# Patient Record
Sex: MALE | Race: WHITE | NOT HISPANIC OR LATINO | Employment: OTHER | URBAN - NONMETROPOLITAN AREA
[De-identification: names, ages, dates, MRNs, and addresses within clinical notes are randomized per-mention and may not be internally consistent; named-entity substitution may affect disease eponyms.]

---

## 2020-09-20 ENCOUNTER — HOSPITAL ENCOUNTER (EMERGENCY)
Facility: HOSPITAL | Age: 25
Discharge: HOME | End: 2020-09-20
Attending: EMERGENCY MEDICINE | Admitting: EMERGENCY MEDICINE

## 2020-09-20 ENCOUNTER — APPOINTMENT (OUTPATIENT)
Dept: GENERAL RADIOLOGY | Facility: HOSPITAL | Age: 25
End: 2020-09-20

## 2020-09-20 VITALS
WEIGHT: 289 LBS | SYSTOLIC BLOOD PRESSURE: 133 MMHG | HEIGHT: 73 IN | TEMPERATURE: 98.8 F | OXYGEN SATURATION: 99 % | RESPIRATION RATE: 16 BRPM | DIASTOLIC BLOOD PRESSURE: 91 MMHG | BODY MASS INDEX: 38.3 KG/M2 | HEART RATE: 81 BPM

## 2020-09-20 DIAGNOSIS — S83.92XA SPRAIN OF LEFT KNEE, UNSPECIFIED LIGAMENT, INITIAL ENCOUNTER: Primary | ICD-10-CM

## 2020-09-20 PROCEDURE — 73562 X-RAY EXAM OF KNEE 3: CPT

## 2020-09-20 PROCEDURE — 99283 EMERGENCY DEPT VISIT LOW MDM: CPT

## 2020-09-20 RX ORDER — IBUPROFEN 800 MG/1
800 TABLET ORAL ONCE
Status: COMPLETED | OUTPATIENT
Start: 2020-09-20 | End: 2020-09-20

## 2020-09-20 RX ORDER — SERTRALINE HYDROCHLORIDE 100 MG/1
100 TABLET, FILM COATED ORAL DAILY
COMMUNITY
End: 2021-07-23

## 2020-09-20 RX ORDER — DICLOFENAC SODIUM 75 MG/1
75 TABLET, DELAYED RELEASE ORAL 2 TIMES DAILY PRN
Qty: 30 TABLET | Refills: 0 | Status: SHIPPED | OUTPATIENT
Start: 2020-09-20 | End: 2021-07-23

## 2020-09-20 RX ORDER — TIZANIDINE HYDROCHLORIDE 4 MG/1
4 CAPSULE, GELATIN COATED ORAL 2 TIMES DAILY PRN
Qty: 20 CAPSULE | Refills: 0 | Status: SHIPPED | OUTPATIENT
Start: 2020-09-20 | End: 2021-07-23

## 2020-09-20 RX ORDER — BUPROPION HYDROCHLORIDE 100 MG/1
100 TABLET ORAL 2 TIMES DAILY
COMMUNITY
End: 2021-07-23 | Stop reason: DRUGHIGH

## 2020-09-20 RX ADMIN — IBUPROFEN 800 MG: 800 TABLET ORAL at 12:22

## 2020-09-20 NOTE — ED PROVIDER NOTES
"Subjective     History provided by:  Patient  Knee Pain  Location:  Knee  Time since incident:  1 hour  Injury: yes    Mechanism of injury: fall    Mechanism of injury comment:  Pt stood up at desk and left knee \"gave out.\"  Pain details:     Quality:  Aching and throbbing    Radiates to:  Does not radiate    Severity:  Mild    Onset quality:  Sudden    Timing:  Constant    Progression:  Unchanged  Chronicity:  New  Prior injury to area:  No  Relieved by:  Nothing  Worsened by:  Bearing weight  Ineffective treatments:  None tried  Associated symptoms: decreased ROM and stiffness    Associated symptoms: no fever    Risk factors: obesity        Review of Systems   Constitutional: Negative.  Negative for fever.   HENT: Negative.    Respiratory: Negative.    Cardiovascular: Negative.  Negative for chest pain.   Gastrointestinal: Negative.  Negative for abdominal pain.   Endocrine: Negative.    Genitourinary: Negative.  Negative for dysuria.   Musculoskeletal: Positive for arthralgias and stiffness.   Skin: Negative.    Neurological: Negative.    Psychiatric/Behavioral: Negative.    All other systems reviewed and are negative.      Past Medical History:   Diagnosis Date   • Anxiety    • Depression    • Intermittent explosive disorder in adult        No Known Allergies    History reviewed. No pertinent surgical history.    History reviewed. No pertinent family history.    Social History     Socioeconomic History   • Marital status: Single     Spouse name: Not on file   • Number of children: Not on file   • Years of education: Not on file   • Highest education level: Not on file   Tobacco Use   • Smoking status: Current Every Day Smoker     Packs/day: 2.00   Substance and Sexual Activity   • Alcohol use: Yes     Comment: occasionally   • Drug use: Never           Objective   Physical Exam  Vitals signs and nursing note reviewed.   Constitutional:       General: He is not in acute distress.     Appearance: He is " well-developed. He is not diaphoretic.   HENT:      Head: Normocephalic and atraumatic.      Right Ear: External ear normal.      Left Ear: External ear normal.      Nose: Nose normal.   Eyes:      Conjunctiva/sclera: Conjunctivae normal.   Neck:      Musculoskeletal: Normal range of motion and neck supple.      Vascular: No JVD.      Trachea: No tracheal deviation.   Cardiovascular:      Rate and Rhythm: Normal rate and regular rhythm.      Heart sounds: Normal heart sounds. No murmur.   Pulmonary:      Effort: Pulmonary effort is normal. No respiratory distress.      Breath sounds: Normal breath sounds. No wheezing.   Abdominal:      Palpations: Abdomen is soft.      Tenderness: There is no abdominal tenderness.   Musculoskeletal:         General: Tenderness present. No deformity.      Comments: Underneath full palpation of the left patella.  There is also pain with medial lateral flexion.   Skin:     General: Skin is warm and dry.      Coloration: Skin is not pale.      Findings: No erythema or rash.   Neurological:      Mental Status: He is alert and oriented to person, place, and time.      Cranial Nerves: No cranial nerve deficit.   Psychiatric:         Behavior: Behavior normal.         Thought Content: Thought content normal.         Procedures           ED Course  ED Course as of Sep 20 1224   Sun Sep 20, 2020   1222 XR ED interpretation:  No apparent acute bony abnormality.     [RB]      ED Course User Index  [RB] Migue Ramirez II, PA                                           MDM  Number of Diagnoses or Management Options  Sprain of left knee, unspecified ligament, initial encounter: new and requires workup     Amount and/or Complexity of Data Reviewed  Tests in the radiology section of CPT®: ordered and reviewed    Risk of Complications, Morbidity, and/or Mortality  Presenting problems: low  Diagnostic procedures: low  Management options: low    Patient Progress  Patient progress: stable      Final  diagnoses:   Sprain of left knee, unspecified ligament, initial encounter            Migue Ramirez II, PA  09/20/20 1724

## 2021-03-10 ENCOUNTER — HOSPITAL ENCOUNTER (OUTPATIENT)
Facility: HOSPITAL | Age: 26
Setting detail: OBSERVATION
Discharge: HOME | End: 2021-03-10
Attending: INTERNAL MEDICINE | Admitting: FAMILY MEDICINE

## 2021-03-10 ENCOUNTER — APPOINTMENT (OUTPATIENT)
Dept: MRI IMAGING | Facility: HOSPITAL | Age: 26
End: 2021-03-10

## 2021-03-10 ENCOUNTER — APPOINTMENT (OUTPATIENT)
Dept: CARDIOLOGY | Facility: HOSPITAL | Age: 26
End: 2021-03-10

## 2021-03-10 ENCOUNTER — APPOINTMENT (OUTPATIENT)
Dept: CT IMAGING | Facility: HOSPITAL | Age: 26
End: 2021-03-10

## 2021-03-10 VITALS
RESPIRATION RATE: 18 BRPM | TEMPERATURE: 98.2 F | DIASTOLIC BLOOD PRESSURE: 75 MMHG | WEIGHT: 307 LBS | OXYGEN SATURATION: 91 % | SYSTOLIC BLOOD PRESSURE: 125 MMHG | BODY MASS INDEX: 40.69 KG/M2 | HEIGHT: 73 IN | HEART RATE: 69 BPM

## 2021-03-10 DIAGNOSIS — R20.0 LEFT SIDED NUMBNESS: Primary | ICD-10-CM

## 2021-03-10 LAB
ALBUMIN SERPL-MCNC: 4.4 G/DL (ref 3.5–5.2)
ALBUMIN/GLOB SERPL: 2.2 G/DL
ALP SERPL-CCNC: 69 U/L (ref 39–117)
ALT SERPL W P-5'-P-CCNC: 51 U/L (ref 1–41)
ANION GAP SERPL CALCULATED.3IONS-SCNC: 12 MMOL/L (ref 5–15)
ASCENDING AORTA: 3.3 CM
AST SERPL-CCNC: 26 U/L (ref 1–40)
BASOPHILS # BLD AUTO: 0.09 10*3/MM3 (ref 0–0.2)
BASOPHILS NFR BLD AUTO: 1.5 % (ref 0–1.5)
BH CV ECHO MEAS - AO MAX PG (FULL): 1.4 MMHG
BH CV ECHO MEAS - AO MAX PG: 5.5 MMHG
BH CV ECHO MEAS - AO MEAN PG (FULL): 0.84 MMHG
BH CV ECHO MEAS - AO MEAN PG: 2.8 MMHG
BH CV ECHO MEAS - AO ROOT AREA (BSA CORRECTED): 1.3
BH CV ECHO MEAS - AO ROOT AREA: 8.7 CM^2
BH CV ECHO MEAS - AO ROOT DIAM: 3.3 CM
BH CV ECHO MEAS - AO V2 MAX: 117.3 CM/SEC
BH CV ECHO MEAS - AO V2 MEAN: 76 CM/SEC
BH CV ECHO MEAS - AO V2 VTI: 24.1 CM
BH CV ECHO MEAS - ASC AORTA: 3.3 CM
BH CV ECHO MEAS - AVA(I,A): 3.9 CM^2
BH CV ECHO MEAS - AVA(I,D): 3.9 CM^2
BH CV ECHO MEAS - AVA(V,A): 4.1 CM^2
BH CV ECHO MEAS - AVA(V,D): 4.1 CM^2
BH CV ECHO MEAS - BSA(HAYCOCK): 2.7 M^2
BH CV ECHO MEAS - BSA: 2.6 M^2
BH CV ECHO MEAS - BZI_BMI: 40.5 KILOGRAMS/M^2
BH CV ECHO MEAS - BZI_METRIC_HEIGHT: 185.4 CM
BH CV ECHO MEAS - BZI_METRIC_WEIGHT: 139.3 KG
BH CV ECHO MEAS - EDV(CUBED): 118 ML
BH CV ECHO MEAS - EDV(MOD-SP2): 139 ML
BH CV ECHO MEAS - EDV(MOD-SP4): 170 ML
BH CV ECHO MEAS - EDV(TEICH): 113 ML
BH CV ECHO MEAS - EF(CUBED): 66.1 %
BH CV ECHO MEAS - EF(MOD-BP): 55 %
BH CV ECHO MEAS - EF(MOD-SP2): 56.1 %
BH CV ECHO MEAS - EF(MOD-SP4): 53.5 %
BH CV ECHO MEAS - EF(TEICH): 57.5 %
BH CV ECHO MEAS - ESV(CUBED): 40 ML
BH CV ECHO MEAS - ESV(MOD-SP2): 61 ML
BH CV ECHO MEAS - ESV(MOD-SP4): 79 ML
BH CV ECHO MEAS - ESV(TEICH): 48.1 ML
BH CV ECHO MEAS - FS: 30.3 %
BH CV ECHO MEAS - IVS/LVPW: 0.99
BH CV ECHO MEAS - IVSD: 1.3 CM
BH CV ECHO MEAS - LA DIMENSION: 3.6 CM
BH CV ECHO MEAS - LA/AO: 1.1
BH CV ECHO MEAS - LAD MAJOR: 5.2 CM
BH CV ECHO MEAS - LAT PEAK E' VEL: 13.5 CM/SEC
BH CV ECHO MEAS - LATERAL E/E' RATIO: 6.6
BH CV ECHO MEAS - LV DIASTOLIC VOL/BSA (35-75): 65.8 ML/M^2
BH CV ECHO MEAS - LV MASS(C)D: 268.9 GRAMS
BH CV ECHO MEAS - LV MASS(C)DI: 104.2 GRAMS/M^2
BH CV ECHO MEAS - LV MAX PG: 4.1 MMHG
BH CV ECHO MEAS - LV MEAN PG: 1.9 MMHG
BH CV ECHO MEAS - LV SYSTOLIC VOL/BSA (12-30): 30.6 ML/M^2
BH CV ECHO MEAS - LV V1 MAX: 101.3 CM/SEC
BH CV ECHO MEAS - LV V1 MEAN: 63 CM/SEC
BH CV ECHO MEAS - LV V1 VTI: 20 CM
BH CV ECHO MEAS - LVIDD: 4.9 CM
BH CV ECHO MEAS - LVIDS: 3.4 CM
BH CV ECHO MEAS - LVLD AP2: 8.6 CM
BH CV ECHO MEAS - LVLD AP4: 8.7 CM
BH CV ECHO MEAS - LVLS AP2: 7.2 CM
BH CV ECHO MEAS - LVLS AP4: 7.3 CM
BH CV ECHO MEAS - LVOT AREA (M): 4.5 CM^2
BH CV ECHO MEAS - LVOT AREA: 4.7 CM^2
BH CV ECHO MEAS - LVOT DIAM: 2.4 CM
BH CV ECHO MEAS - LVPWD: 1.4 CM
BH CV ECHO MEAS - MED PEAK E' VEL: 11.2 CM/SEC
BH CV ECHO MEAS - MEDIAL E/E' RATIO: 8
BH CV ECHO MEAS - MV A MAX VEL: 51.1 CM/SEC
BH CV ECHO MEAS - MV DEC SLOPE: 379.6 CM/SEC^2
BH CV ECHO MEAS - MV DEC TIME: 0.13 SEC
BH CV ECHO MEAS - MV E MAX VEL: 90.5 CM/SEC
BH CV ECHO MEAS - MV E/A: 1.8
BH CV ECHO MEAS - MV MAX PG: 4.4 MMHG
BH CV ECHO MEAS - MV MEAN PG: 1.9 MMHG
BH CV ECHO MEAS - MV P1/2T MAX VEL: 103.7 CM/SEC
BH CV ECHO MEAS - MV P1/2T: 80.1 MSEC
BH CV ECHO MEAS - MV V2 MAX: 104.7 CM/SEC
BH CV ECHO MEAS - MV V2 MEAN: 65.4 CM/SEC
BH CV ECHO MEAS - MV V2 VTI: 29.9 CM
BH CV ECHO MEAS - MVA P1/2T LCG: 2.1 CM^2
BH CV ECHO MEAS - MVA(P1/2T): 2.7 CM^2
BH CV ECHO MEAS - MVA(VTI): 3.1 CM^2
BH CV ECHO MEAS - PA ACC SLOPE: 502.4 CM/SEC^2
BH CV ECHO MEAS - PA ACC TIME: 0.14 SEC
BH CV ECHO MEAS - PA MAX PG: 5.4 MMHG
BH CV ECHO MEAS - PA PR(ACCEL): 14.8 MMHG
BH CV ECHO MEAS - PA V2 MAX: 116.1 CM/SEC
BH CV ECHO MEAS - PI END-D VEL: 115.4 CM/SEC
BH CV ECHO MEAS - SI(AO): 81.2 ML/M^2
BH CV ECHO MEAS - SI(CUBED): 30.2 ML/M^2
BH CV ECHO MEAS - SI(LVOT): 36.5 ML/M^2
BH CV ECHO MEAS - SI(MOD-SP2): 30.2 ML/M^2
BH CV ECHO MEAS - SI(MOD-SP4): 35.2 ML/M^2
BH CV ECHO MEAS - SI(TEICH): 25.2 ML/M^2
BH CV ECHO MEAS - SV(AO): 209.6 ML
BH CV ECHO MEAS - SV(CUBED): 78 ML
BH CV ECHO MEAS - SV(LVOT): 94.3 ML
BH CV ECHO MEAS - SV(MOD-SP2): 78 ML
BH CV ECHO MEAS - SV(MOD-SP4): 91 ML
BH CV ECHO MEAS - SV(TEICH): 65 ML
BH CV ECHO MEASUREMENTS AVERAGE E/E' RATIO: 7.33
BH CV VAS BP LEFT ARM: NORMAL MMHG
BH CV XLRA - RV BASE: 4.5 CM
BH CV XLRA - RV LENGTH: 7.9 CM
BH CV XLRA - RV MID: 3.8 CM
BILIRUB SERPL-MCNC: 0.5 MG/DL (ref 0–1.2)
BUN SERPL-MCNC: 13 MG/DL (ref 6–20)
BUN/CREAT SERPL: 14.9 (ref 7–25)
CALCIUM SPEC-SCNC: 8.9 MG/DL (ref 8.6–10.5)
CHLORIDE SERPL-SCNC: 102 MMOL/L (ref 98–107)
CHOLEST SERPL-MCNC: 158 MG/DL (ref 0–200)
CO2 SERPL-SCNC: 23 MMOL/L (ref 22–29)
CREAT SERPL-MCNC: 0.87 MG/DL (ref 0.76–1.27)
DEPRECATED RDW RBC AUTO: 44.8 FL (ref 37–54)
EOSINOPHIL # BLD AUTO: 0.46 10*3/MM3 (ref 0–0.4)
EOSINOPHIL NFR BLD AUTO: 7.8 % (ref 0.3–6.2)
ERYTHROCYTE [DISTWIDTH] IN BLOOD BY AUTOMATED COUNT: 13.9 % (ref 12.3–15.4)
FLUAV RNA RESP QL NAA+PROBE: NOT DETECTED
FLUBV RNA RESP QL NAA+PROBE: NOT DETECTED
GFR SERPL CREATININE-BSD FRML MDRD: 108 ML/MIN/1.73
GLOBULIN UR ELPH-MCNC: 2 GM/DL
GLUCOSE BLDC GLUCOMTR-MCNC: 117 MG/DL (ref 70–130)
GLUCOSE BLDC GLUCOMTR-MCNC: 126 MG/DL (ref 70–130)
GLUCOSE SERPL-MCNC: 105 MG/DL (ref 65–99)
HBA1C MFR BLD: 5.1 % (ref 4.8–5.6)
HCT VFR BLD AUTO: 43.7 % (ref 37.5–51)
HDLC SERPL-MCNC: 19 MG/DL (ref 40–60)
HGB BLD-MCNC: 14.7 G/DL (ref 13–17.7)
IMM GRANULOCYTES # BLD AUTO: 0.02 10*3/MM3 (ref 0–0.05)
IMM GRANULOCYTES NFR BLD AUTO: 0.3 % (ref 0–0.5)
LDLC SERPL CALC-MCNC: 60 MG/DL (ref 0–100)
LDLC/HDLC SERPL: 1.87 {RATIO}
LEFT ATRIUM VOLUME INDEX: 21.7 ML/M^2
LEFT ATRIUM VOLUME: 56 ML
LYMPHOCYTES # BLD AUTO: 2.28 10*3/MM3 (ref 0.7–3.1)
LYMPHOCYTES NFR BLD AUTO: 38.6 % (ref 19.6–45.3)
MAXIMAL PREDICTED HEART RATE: 196 BPM
MCH RBC QN AUTO: 29.9 PG (ref 26.6–33)
MCHC RBC AUTO-ENTMCNC: 33.6 G/DL (ref 31.5–35.7)
MCV RBC AUTO: 88.8 FL (ref 79–97)
MONOCYTES # BLD AUTO: 0.6 10*3/MM3 (ref 0.1–0.9)
MONOCYTES NFR BLD AUTO: 10.2 % (ref 5–12)
NEUTROPHILS NFR BLD AUTO: 2.46 10*3/MM3 (ref 1.7–7)
NEUTROPHILS NFR BLD AUTO: 41.6 % (ref 42.7–76)
NRBC BLD AUTO-RTO: 0 /100 WBC (ref 0–0.2)
PLATELET # BLD AUTO: 280 10*3/MM3 (ref 140–450)
PMV BLD AUTO: 9.3 FL (ref 6–12)
POTASSIUM SERPL-SCNC: 3.3 MMOL/L (ref 3.5–5.2)
PROT SERPL-MCNC: 6.4 G/DL (ref 6–8.5)
RBC # BLD AUTO: 4.92 10*6/MM3 (ref 4.14–5.8)
SARS-COV-2 RNA RESP QL NAA+PROBE: NOT DETECTED
SODIUM SERPL-SCNC: 137 MMOL/L (ref 136–145)
STRESS TARGET HR: 167 BPM
TRIGL SERPL-MCNC: 517 MG/DL (ref 0–150)
TSH SERPL DL<=0.05 MIU/L-ACNC: 4.76 UIU/ML (ref 0.27–4.2)
VLDLC SERPL-MCNC: 79 MG/DL (ref 5–40)
WBC # BLD AUTO: 5.91 10*3/MM3 (ref 3.4–10.8)

## 2021-03-10 PROCEDURE — 84443 ASSAY THYROID STIM HORMONE: CPT | Performed by: INTERNAL MEDICINE

## 2021-03-10 PROCEDURE — G0378 HOSPITAL OBSERVATION PER HR: HCPCS

## 2021-03-10 PROCEDURE — 83036 HEMOGLOBIN GLYCOSYLATED A1C: CPT | Performed by: NURSE PRACTITIONER

## 2021-03-10 PROCEDURE — 99234 HOSP IP/OBS SM DT SF/LOW 45: CPT | Performed by: FAMILY MEDICINE

## 2021-03-10 PROCEDURE — 85025 COMPLETE CBC W/AUTO DIFF WBC: CPT | Performed by: INTERNAL MEDICINE

## 2021-03-10 PROCEDURE — 97165 OT EVAL LOW COMPLEX 30 MIN: CPT

## 2021-03-10 PROCEDURE — G0379 DIRECT REFER HOSPITAL OBSERV: HCPCS

## 2021-03-10 PROCEDURE — 70498 CT ANGIOGRAPHY NECK: CPT

## 2021-03-10 PROCEDURE — 0 IOPAMIDOL PER 1 ML: Performed by: INTERNAL MEDICINE

## 2021-03-10 PROCEDURE — 82962 GLUCOSE BLOOD TEST: CPT

## 2021-03-10 PROCEDURE — 0042T HC CT CEREBRAL PERFUSION W/WO CONTRAST: CPT

## 2021-03-10 PROCEDURE — 93306 TTE W/DOPPLER COMPLETE: CPT

## 2021-03-10 PROCEDURE — 70551 MRI BRAIN STEM W/O DYE: CPT

## 2021-03-10 PROCEDURE — 97110 THERAPEUTIC EXERCISES: CPT

## 2021-03-10 PROCEDURE — 97161 PT EVAL LOW COMPLEX 20 MIN: CPT

## 2021-03-10 PROCEDURE — 70496 CT ANGIOGRAPHY HEAD: CPT

## 2021-03-10 PROCEDURE — 80061 LIPID PANEL: CPT | Performed by: NURSE PRACTITIONER

## 2021-03-10 PROCEDURE — 80053 COMPREHEN METABOLIC PANEL: CPT | Performed by: INTERNAL MEDICINE

## 2021-03-10 PROCEDURE — 93306 TTE W/DOPPLER COMPLETE: CPT | Performed by: INTERNAL MEDICINE

## 2021-03-10 PROCEDURE — 92523 SPEECH SOUND LANG COMPREHEN: CPT

## 2021-03-10 PROCEDURE — 99205 OFFICE O/P NEW HI 60 MIN: CPT | Performed by: PSYCHIATRY & NEUROLOGY

## 2021-03-10 PROCEDURE — 87636 SARSCOV2 & INF A&B AMP PRB: CPT | Performed by: INTERNAL MEDICINE

## 2021-03-10 RX ORDER — AMOXICILLIN AND CLAVULANATE POTASSIUM 875; 125 MG/1; MG/1
1 TABLET, FILM COATED ORAL EVERY 12 HOURS SCHEDULED
Status: DISCONTINUED | OUTPATIENT
Start: 2021-03-10 | End: 2021-03-10 | Stop reason: HOSPADM

## 2021-03-10 RX ORDER — SODIUM CHLORIDE 0.9 % (FLUSH) 0.9 %
10 SYRINGE (ML) INJECTION AS NEEDED
Status: DISCONTINUED | OUTPATIENT
Start: 2021-03-10 | End: 2021-03-10 | Stop reason: HOSPADM

## 2021-03-10 RX ORDER — SERTRALINE HYDROCHLORIDE 100 MG/1
100 TABLET, FILM COATED ORAL DAILY
Status: DISCONTINUED | OUTPATIENT
Start: 2021-03-10 | End: 2021-03-10

## 2021-03-10 RX ORDER — SODIUM CHLORIDE 0.9 % (FLUSH) 0.9 %
10 SYRINGE (ML) INJECTION EVERY 12 HOURS SCHEDULED
Status: DISCONTINUED | OUTPATIENT
Start: 2021-03-10 | End: 2021-03-10 | Stop reason: HOSPADM

## 2021-03-10 RX ORDER — POTASSIUM CHLORIDE 750 MG/1
40 CAPSULE, EXTENDED RELEASE ORAL ONCE
Status: COMPLETED | OUTPATIENT
Start: 2021-03-10 | End: 2021-03-10

## 2021-03-10 RX ORDER — ATORVASTATIN CALCIUM 40 MG/1
80 TABLET, FILM COATED ORAL NIGHTLY
Status: DISCONTINUED | OUTPATIENT
Start: 2021-03-10 | End: 2021-03-10 | Stop reason: HOSPADM

## 2021-03-10 RX ORDER — ASPIRIN 325 MG
325 TABLET ORAL DAILY
Status: DISCONTINUED | OUTPATIENT
Start: 2021-03-10 | End: 2021-03-10 | Stop reason: HOSPADM

## 2021-03-10 RX ORDER — BUPROPION HYDROCHLORIDE 100 MG/1
100 TABLET ORAL EVERY 12 HOURS SCHEDULED
Status: DISCONTINUED | OUTPATIENT
Start: 2021-03-10 | End: 2021-03-10

## 2021-03-10 RX ORDER — ASPIRIN 300 MG/1
300 SUPPOSITORY RECTAL DAILY
Status: DISCONTINUED | OUTPATIENT
Start: 2021-03-10 | End: 2021-03-10 | Stop reason: HOSPADM

## 2021-03-10 RX ADMIN — SODIUM CHLORIDE, PRESERVATIVE FREE 10 ML: 5 INJECTION INTRAVENOUS at 09:19

## 2021-03-10 RX ADMIN — POTASSIUM CHLORIDE 40 MEQ: 750 CAPSULE, EXTENDED RELEASE ORAL at 11:50

## 2021-03-10 RX ADMIN — IOPAMIDOL 115 ML: 755 INJECTION, SOLUTION INTRAVENOUS at 04:37

## 2021-03-10 RX ADMIN — AMOXICILLIN AND CLAVULANATE POTASSIUM 1 TABLET: 875; 125 TABLET, FILM COATED ORAL at 05:38

## 2021-03-10 RX ADMIN — ASPIRIN 325 MG ORAL TABLET 325 MG: 325 PILL ORAL at 09:19

## 2021-03-10 NOTE — PLAN OF CARE
Goal Outcome Evaluation:  Plan of Care Reviewed With: patient  Progress: improving  Outcome Summary: OT eval complete. Pt completes bed mobility and CI and transfers with SBA and no AE. Pt ambulates with supervision and no AE. Pt independent with LBD and toileting this date. Pt does demosntrate L UE weakness and decreased L hand FM coordination. OT issued foam blocks as well as strengthening hand out and coordination exercises to improve strength and coordination in LUE. Recommend d/c home with OP OT services.

## 2021-03-10 NOTE — H&P
"    [facility] Medicine Services  HISTORY AND PHYSICAL    Patient Name: Luis Darby  : 1995  MRN: [id number]  Primary Care Physician: Forrest Lo PA  Date of admission: 3/10/2021    Subjective   Subjective     Chief Complaint:  Transfer for stroke  Left sided numbness/weakness    HPI:  Luis Darby is a 25 y.o. male with a history of psychiatric issues, tobacco abuse (2ppd smoker) presents as a transfer from [facility] ED for possible stroke, with left sided weakness and numbness. 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  He was able to make it to the ER. He denies trouble speaking or confusion, no facial droop.  He did have left sided facial numbness, as well as decreased sensation in his left arm and leg.  Pt states he did feel he \"zoned out\" at work today but otherwise felt like his usual self.    CT head was negative at the outside ED.  Upon arrival, patient was seen by the stroke navigator CLAUDIA and stat labs and imaging have been ordered and pending.  NIH score 1.    Denies CP, SOB, cough, fever, chills, N/V/D. He did have medication adjustment recently and started on lexapro.    Hospital medicine will admit for further evaluation and treatment.     Attending HPI:  24 y/o male with hx of tobacco abuse and morbid obesity as well as intermittent explosive disorder here w/ c/o onset of left sided numbness/weakness while driving around 10:45 pm.  Pt also noted feeling \"mentally off\" and states his co-workers noticed the same.  No facial droop but did have left sided facial numbness.  No prior hx of stroke.   Pt does note change in meds w/ the addition of lexapro last week.     NIH at OSH 1.    Current COVID Risks are:  [] Fever []  Cough [] Shortness of breath [] Fatigue [] Change in taste or smell    [] Exposure to COVID positive patient  [] High risk facility   [x]  NONE    Review of Systems   HENT: Positive for dental " problem.    Eyes: Negative.    Respiratory: Negative.    Cardiovascular: Negative.    Endocrine: Negative.    Genitourinary: Negative.    Musculoskeletal: Negative.    Skin: Negative.    Allergic/Immunologic: Negative.    Neurological: Positive for weakness and numbness.   Hematological: Negative.    Psychiatric/Behavioral: Negative.         All other systems reviewed and are negative.     Personal History     Past Medical History:   Diagnosis Date   • Anxiety    • Depression    • Intermittent explosive disorder in adult        No past surgical history on file.    Family History: family history is not on file. Otherwise pertinent FHx was reviewed and unremarkable.     Social History:  reports that he has been smoking. He has been smoking about 2.00 packs per day. He does not have any smokeless tobacco history on file. He reports current alcohol use. He reports that he does not use drugs.  Social History     Social History Narrative   • Not on file       Medications:  TiZANidine, buPROPion, diclofenac, and sertraline    No Known Allergies    Objective   Objective     Vital Signs:   Temp:  [97.5 °F (36.4 °C)] 97.5 °F (36.4 °C)  Heart Rate:  [69] 69  Resp:  [20] 20  BP: (145)/(77) 145/77    Physical Exam   Constitutional: Awake, alert, dissheveled male  Eyes: PERRLA, sclerae anicteric, no conjunctival injection  HENT: NCAT, mucous membranes moist  - poor dentition with necrotic odor  Neck: Supple, no thyromegaly, no lymphadenopathy, trachea midline  Respiratory: Clear to auscultation bilaterally, nonlabored respirations   Cardiovascular: RRR, no murmurs, rubs, or gallops, palpable pedal pulses bilaterally  Gastrointestinal: Positive bowel sounds, soft, nontender, nondistended  Musculoskeletal: No bilateral ankle edema, no clubbing or cyanosis to extremities  Psychiatric: Appropriate affect, cooperative  Neurologic: Oriented x 3, strength diminished left arm, leg  Decreased sensation left face, arm, leg  Skin: No  анна    Separate exam performed by me w/ no changes to the above.      Results Reviewed:  I have personally reviewed most recent indicated data and agree with findings including:  []  Laboratory  []  Radiology  []  EKG/Telemetry  []  Pathology  []  Cardiac/Vascular Studies  [x]  Old records  []  Other:      Most pertinent findings include:  Pending however OSH cbc, cmp unremarkable other than K 3.4.  HCT negative at OSH.    LAB RESULTS:                              Brief Urine Lab Results     None        Microbiology Results (last 10 days)     ** No results found for the last 240 hours. **          CT Cerebral Perfusion With & Without Contrast    Result Date: 3/10/2021  CT CEREBRAL PERFUSION W WO CONTRAST HISTORY: Left-sided weakness since last night. TECHNIQUE: Axial CT images of the brain without and with intravenous contrast using cerebral perfusion protocol. Post-processing parametric maps were created using RAPID software and reviewed. Radiation dose reduction techniques included automated exposure control or exposure modulation based on body size. CT and nuclear cardiology exams in the last 12 months: 0. COMPARISON: None. FINDINGS: Arterial input function is optimal. Perfusion maps are symmetric without evidence of cerebral ischemia or core infarction.     Impression: Normal brain perfusion CT. Signer Name: Christiano Chamorro MD  Signed: 3/10/2021 4:52 AM  Workstation Name: [facility]  [facility]          Assessment/Plan   Assessment & Plan       Left sided numbness      1. Left sided numbness/weakness;  **pt w/ tobacco abuse, obesity and also very poor dentitia  - rule out CVA  - CT head neg at OSH  - stat CT perfusion, CTA head, neck, MRI brain wo contrast  - neurology consult  - neuro checks  - asa 324 mg  - stat ECHO; may need WOLF as well to r/o vegetation w/ extremely poor dentitia  - PT/OT eval and treat    2. Psychiatric issues  - cont lexapro, trileptal ---> need to update med  list    3. Poor dentitia;  -pt needs extraction; may be contributing to above  -will place on augmentin for now    DVT prophylaxis:  SCDs      CODE STATUS:  full  Code Status and Medical Interventions:   Ordered at: 03/10/21 0441     Code Status:    CPR     Medical Interventions (Level of Support Prior to Arrest):    Full         This note has been completed as part of a split-shared workflow.     Signature: Electronically signed by VITALIY Barboza, 03/10/21, 4:05 AM EST      Electronically signed by Reba Seymour MD, 03/10/21, 5:02 AM EST.    OBSERVATION status, however if further evaluation or treatment plans warrant, status may change.  Based upon current information, I predict patient's care encounter to be less than or equal to 2 midnights.

## 2021-03-10 NOTE — PLAN OF CARE
Goal Outcome Evaluation:  Plan of Care Reviewed With: (P) patient     Outcome Summary: (P) Pt was modified independent with bed mobility, SBA for STS, gait, and stairs. Pt ambulated 278' with no AD. Pt ascended and descended 3 steps using L rail with no unsteadiness noted. Pt demonstrates decreased L ankle strategy during dynamic balance activites and slightly antalgic on LLE. Pt was lethargic and slightly fatigued at end of PT session. Pt is safe to be up ad ROSE MARIE will d/c inpatient PT at this time. D/C recommendation to home with OP PT for balance, coordinaiton, and strengthing to return to work.

## 2021-03-10 NOTE — THERAPY DISCHARGE NOTE
Patient Name: Luis Darby  : 1995    MRN: [id number]                              Today's Date: 3/10/2021       Admit Date: 3/10/2021    Visit Dx: No diagnosis found.  Patient Active Problem List   Diagnosis   • Left sided numbness     Past Medical History:   Diagnosis Date   • Anxiety    • Depression    • Intermittent explosive disorder in adult      No past surgical history on file.  General Information     Row Name 03/10/21 0907          Physical Therapy Time and Intention    Document Type  discharge evaluation/summary  (Pended)   -RN     Mode of Treatment  individual therapy;physical therapy  (Pended)   -RN     Row Name 03/10/21 0907          General Information    Patient Profile Reviewed  yes  (Pended)   -RN     Prior Level of Function  independent:;all household mobility;gait;community mobility;ADL's;driving;work  (Pended)  pt states he is a   -RN     Existing Precautions/Restrictions  other (see comments)  (Pended)  decreased LLE senstation and LLE weakness  -RN     Barriers to Rehab  --  (Pended)  hx of psychiatric impairments  -RN     Row Name 03/10/21 0907          Living Environment    Lives With  child(tessa), dependent;spouse;parent(s)  (Pended)  Lives with mother  -RN     Row Name 03/10/21 0907          Home Main Entrance    Number of Stairs, Main Entrance  three  (Pended)   -RN     Stair Railings, Main Entrance  railing on left side (ascending)  (Pended)   -RN     Row Name 03/10/21 0907          Stairs Within Home, Primary    Number of Stairs, Within Home, Primary  none  (Pended)   -RN     Row Name 03/10/21 0907          Cognition    Orientation Status (Cognition)  oriented x 4  (Pended)   -RN     Row Name 03/10/21 0907          Safety Issues, Functional Mobility    Safety Issues Affecting Function (Mobility)  safety precaution awareness;safety precautions follow-through/compliance  (Pended)   -RN     Impairments Affecting Function (Mobility)  balance;strength;sensation/sensory  awareness;coordination  (Pended)   -RN     Comment, Safety Issues/Impairments (Mobility)  Pt is slightly bradykinetic on LLE and requires more effort to perform LAQ repetitions x10 with slight dyscoordination. Pt demonstrates decreased L ankle strategy duirng standing marches but is able to walk with no external support and no LOB noted.  (Pended)   -RN       User Key  (r) = Recorded By, (t) = Taken By, (c) = Cosigned By    Initials Name Provider Type    RN Shanna Molina, PT Student PT Student        Mobility     Row Name 03/10/21 0907          Bed Mobility    Bed Mobility  sit-supine;scooting/bridging  (Pended)   -RN     Scooting/Bridging [place] (Bed Mobility)  supervision  (Pended)   -RN     Supine-Sit [place] (Bed Mobility)  supervision  (Pended)   -RN     Assistive Device (Bed Mobility)  bed rails  (Pended)   -RN     Comment (Bed Mobility)  Pt sitting in chair at beginning of PT session. Placed in bed at end of session. Pt states he is confortable in R S/L position.  (Pended)   -RN     Row Name 03/10/21 0907          Sit-Stand Transfer    Sit-Stand [place] (Transfers)  standby assist;other (see comments)  (Pended)  SBA for eval  -RN     Assistive Device (Sit-Stand Transfers)  other (see comments)  (Pended)  no AD  -RN     Row Name 03/10/21 0907          Gait/Stairs (Locomotion)    [place] Level (Gait)  standby assist;1 person assist  (Pended)   -RN     Assistive Device (Gait)  other (see comments)  (Pended)  no AD  -RN     Distance in Feet (Gait)  278'  (Pended)   -RN     Deviations/Abnormal Patterns (Gait)  base of support, wide;gait speed decreased;emily decreased  (Pended)   -RN     Left Sided Gait Deviations  heel strike decreased  (Pended)   -RN     [place] Level (Stairs)  stand by assist;1 person assist;other (see comments)  (Pended)  SBA for eval  -RN     Assistive Device (Stairs)  other (see comments)  (Pended)  no AD  -RN     Handrail Location (Stairs)  left side  (ascending);right side (descending)  (Pended)   -RN     Ascending Technique (Stairs)  step-over-step  (Pended)   -RN     Descending Technique (Stairs)  step-over-step  (Pended)   -RN     Comment (Gait/Stairs)  Pt is slightly ataxic on LLE with gait especially with forward tandem walking but pt notes previous balance difficulties before L sided numbness and weakness. Pt had no LOB and multitasking mild difficulty with multitasking during gait.  (Pended)   -RN     Row Name 03/10/21 0907          Mobility    Extremity Weight-bearing Status  other (see comments)  (Pended)  WBAT  -RN       User Key  (r) = Recorded By, (t) = Taken By, (c) = Cosigned By    Initials Name Provider Type    RN Shanna Molina, PT Student PT Student        Obj/Interventions     Row Name 03/10/21 0907          Range of Motion Comprehensive    General Range of Motion  bilateral lower extremity ROM WFL  (Pended)   -RN     Row Name 03/10/21 0907          Strength Comprehensive (MMT)    General Manual Muscle Testing (MMT) Assessment  lower extremity strength deficits identified  (Pended)   -RN     Comment, General Manual Muscle Testing (MMT) Assessment  RLE grossly 5/5, LLE grossly 4/5  (Pended)   -RN     Row Name 03/10/21 0907          Motor Skills    Motor Skills  coordination  (Pended)   -RN     Coordination  gross motor deficit;dysdiadochokinesia;left  (Pended)   -RN     Therapeutic Exercise  hip;knee  (Pended)   -RN     Row Name 03/10/21 0907          Hip (Therapeutic Exercise)    Hip (Therapeutic Exercise)  AROM (active range of motion)  (Pended)   -RN     Hip AROM (Therapeutic Exercise)  bilateral;standing;flexion;5 repetitions  (Pended)   -RN     Row Name 03/10/21 0907          Knee (Therapeutic Exercise)    Knee (Therapeutic Exercise)  AROM (active range of motion)  (Pended)   -RN     Knee AROM (Therapeutic Exercise)  bilateral;sitting;10 repetitions  (Pended)   -RN     Row Name 03/10/21 0907          Balance    Balance Assessment  sitting  static balance;sitting dynamic balance;standing static balance;standing dynamic balance  (Pended)   -RN     Static Sitting Balance  WFL;supported;sitting in chair  (Pended)   -RN     Dynamic Sitting Balance  WFL;supported;sitting in chair  (Pended)   -RN     Static Standing Balance  WFL;unsupported;standing  (Pended)   -RN     Dynamic Standing Balance  WFL;unsupported;standing  (Pended)   -RN     Balance Interventions  standing;dynamic;minimal challenge;other (see comments)  (Pended)  Pt was able to change gait speed, 180 degree turn, perform tandem walking (5'), and B side stepping (5' each direction, 1x).  -RN     Comment, Balance  Pt shows decreased L ankle strategy with standing marches. Pt slightly ataxic on LLE during tandem walking.  (Pended)   -RN     Row Name 03/10/21 0907          Sensory Assessment (Somatosensory)    Sensory Assessment (Somatosensory)  other (see comments)  (Pended)  L LE sensation impaired, numbness L leg and L foot.  -RN       User Key  (r) = Recorded By, (t) = Taken By, (c) = Cosigned By    Initials Name Provider Type    RN Shanna Molina, PT Student PT Student        Goals/Plan     Row Name 03/10/21 0907          Gait Training Goal 1 (PT)    Activity/Assistive Device (Gait Training Goal 1, PT)  gait (walking locomotion);improve balance and speed;increase endurance/gait distance  (Pended)   -RN     [place] Level (Gait Training Goal 1, PT)  independent  (Pended)   -RN     Distance (Gait Training Goal 1, PT)  250'  (Pended)   -RN     Time Frame (Gait Training Goal 1, PT)  long term goal (LTG);2 weeks  (Pended)   -RN     Progress/Outcome (Gait Training Goal 1, PT)  goal met  (Pended)   -RN     Row Name 03/10/21 0907          Stairs Goal 1 (PT)    Activity/Assistive Device (Stairs Goal 1, PT)  ascending stairs;descending stairs;using handrail, left  (Pended)   -RN     [place] Level/Cues Needed (Stairs Goal 1, PT)  modified independence  (Pended)   -RN     Number of Stairs (Stairs  Goal 1, PT)  3  (Pended)   -RN     Time Frame (Stairs Goal 1, PT)  long term goal (LTG);2 weeks  (Pended)   -RN     Progress/Outcome (Stairs Goal 1, PT)  goal met  (Pended)   -RN       User Key  (r) = Recorded By, (t) = Taken By, (c) = Cosigned By    Initials Name Provider Type    RN Shanna Molina, PT Student PT Student        Clinical Impression     Row Name 03/10/21 0907          Pain    Additional Documentation  Pain Scale: Numbers Pre/Post-Treatment (Group)  (Pended)   -RN     Row Name 03/10/21 0907          Pain Scale: Numbers Pre/Post-Treatment    Pretreatment Pain Rating  2/10  (Pended)   -RN     Posttreatment Pain Rating  0/10 - no pain  (Pended)   -RN     Pain Location - Side  Left  (Pended)  L IV insertional site  -RN     Pre/Posttreatment Pain Comment  NSG removed IV in L antecunbital space  (Pended)   -RN     Pain Intervention(s)  Ambulation/increased activity;Repositioned  (Pended)   -RN     Row Name 03/10/21 0907          Plan of Care Review    Plan of Care Reviewed With  patient  (Pended)   -RN     Outcome Summary  Pt was modified independent with bed mobility, SBA for STS, gait, and stairs. Pt ambulated 278' with no AD. Pt ascended and descended 3 steps using L rail with no unsteadiness noted. Pt demonstrates decreased L ankle strategy during dynamic balance activites and slightly antalgic on LLE. Pt was lethargic and slightly fatigued at end of PT session. Pt is safe to be up ad [facility] will d/c inpatient PT at this time. D/C recommendation to home with OP PT for balance, coordinaiton, and strengthing to return to work.  (Pended)   -RN     Row Name 03/10/21 0907          Therapy Assessment/Plan (PT)    Rehab Potential (PT)  good, to achieve stated therapy goals  (Pended)   -RN     Criteria for Skilled Interventions Met (PT)  yes  (Pended)   -RN     Row Name 03/10/21 0907          Vital Signs    Post Systolic BP Rehab  117  (Pended)   -RN     Post Treatment Diastolic BP  65  (Pended)   -RN      Pretreatment Heart Rate (beats/min)  74  (Pended)   -RN     Posttreatment Heart Rate (beats/min)  71  (Pended)   -RN     Pre SpO2 (%)  97  (Pended)   -RN     O2 Delivery Pre Treatment  room air  (Pended)   -RN     Post SpO2 (%)  95  (Pended)   -RN     O2 Delivery Post Treatment  room air  (Pended)   -RN     Pre Patient Position  Sitting  (Pended)   -RN     Intra Patient Position  Standing  (Pended)   -RN     Post Patient Position  Supine  (Pended)   -RN     Row Name 03/10/21 0907          Positioning and Restraints    Pre-Treatment Position  sitting in chair/recliner  (Pended)   -RN     Post Treatment Position  bed  (Pended)   -RN     In Bed  side lying right;call light within reach;encouraged to call for assist;notified nsg  (Pended)   -RN       User Key  (r) = Recorded By, (t) = Taken By, (c) = Cosigned By    Initials Name Provider Type    RN Shanna Molina, PT Student PT Student        Outcome Measures     Row Name 03/10/21 0907          How much help from another person do you currently need...    Turning from your back to your side while in flat bed without using bedrails?  4  (Pended)   -RN     Moving from lying on back to sitting on the side of a flat bed without bedrails?  4  (Pended)   -RN     Moving to and from a bed to a chair (including a wheelchair)?  4  (Pended)   -RN     Standing up from a chair using your arms (e.g., wheelchair, bedside chair)?  4  (Pended)   -RN     Climbing 3-5 steps with a railing?  4  (Pended)   -RN     To walk in hospital room?  4  (Pended)   -RN     AM-PAC 6 Clicks Score (PT)  24  (Pended)   -RN     Row Name 03/10/21 0907          Modified [place] Scale    Modified [place] Scale  1 - No significant disability despite symptoms.  Able to carry out all usual duties and activities.  (Pended)   -RN     Row Name 03/10/21 0907          Functional Assessment    Outcome Measure Options  AM-PAC 6 Clicks Basic Mobility (PT);Modified [place]  (Pended)   -RN       User Key  (r) = Recorded By,  (t) = Taken By, (c) = Cosigned By    Initials Name Provider Type    RN Shanna Molina, PT Student PT Student        Physical Therapy Education                 Title: PT OT SLP Therapies (Done)     Topic: Physical Therapy (Done)     Point: Mobility training (Done)     Learning Progress Summary           Patient Acceptance, E,TB, VU,NR by RN at 3/10/2021 0907    Comment: Pt instructed on PT POC, PT scope, and D/C recommendation to OP PT.                   Point: Body mechanics (Done)     Learning Progress Summary           Patient Acceptance, E,TB, VU,NR by RN at 3/10/2021 0907    Comment: Pt instructed on PT POC, PT scope, and D/C recommendation to OP PT.                   Point: Precautions (Done)     Learning Progress Summary           Patient Acceptance, E,TB, VU,NR by RN at 3/10/2021 0907    Comment: Pt instructed on PT POC, PT scope, and D/C recommendation to OP PT.                               User Key     Initials Effective Dates Name Provider Type Discipline    RN 07/21/20 -  Shanna Molina, PT Student PT Student PT              PT Recommendation and Plan  Planned Therapy Interventions (PT): (P) balance training, bed mobility training, gait training, home exercise program, motor coordination training, stair training, strengthening, stretching, ROM (range of motion), postural re-education, patient/family education, neuromuscular re-education, transfer training  Plan of Care Reviewed With: (P) patient  Outcome Summary: (P) Pt was modified independent with bed mobility, SBA for STS, gait, and stairs. Pt ambulated 278' with no AD. Pt ascended and descended 3 steps using L rail with no unsteadiness noted. Pt demonstrates decreased L ankle strategy during dynamic balance activites and slightly antalgic on LLE. Pt was lethargic and slightly fatigued at end of PT session. Pt is safe to be up ad [facility] will d/c inpatient PT at this time. D/C recommendation to home with OP PT for balance, coordinaiton, and strengthing to  return to work.     Time Calculation:   PT Charges     Row Name 03/10/21 0907             Time Calculation    Start Time  0907  (Pended)   -RN      PT Received On  03/10/21  (Pended)   -RN         Timed Charges    01485 - PT Therapeutic Activity Minutes  --  (Pended)   -RN        User Key  (r) = Recorded By, (t) = Taken By, (c) = Cosigned By    Initials Name Provider Type    RN Shanna Molina, PT Student PT Student        Therapy Charges for Today     Code Description Service Date Service Provider Modifiers Qty    [id number] HC PT EVAL LOW COMPLEXITY 4 3/10/2021 Shanna Molina, PT Student GP 1          PT G-Codes  Outcome Measure Options: AM-PAC 6 Clicks Daily Activity (OT)  AM-PAC 6 Clicks Score (PT): (P) 24  AM-PAC 6 Clicks Score (OT): 21  Modified [place] Scale: (P) 1 - No significant disability despite symptoms.  Able to carry out all usual duties and activities.    Shanna Molina, PT Student  3/10/2021

## 2021-03-10 NOTE — PLAN OF CARE
Goal Outcome Evaluation:  Plan of Care Reviewed With: (P) patient  Progress: (P) no change (initial eval)     SLP evaluation completed. Will sign off as pt has speech, language, and cognition that are within functional limits. Pt does not require skilled SLP intervention at this time. . Please see note for further details and recommendations.

## 2021-03-10 NOTE — THERAPY EVALUATION
Patient Name: Lius Darby  : 1995    MRN: [id number]                              Today's Date: 3/10/2021       Admit Date: 3/10/2021    Visit Dx: No diagnosis found.  Patient Active Problem List   Diagnosis   • Left sided numbness     Past Medical History:   Diagnosis Date   • Anxiety    • Depression    • Intermittent explosive disorder in adult      No past surgical history on file.  General Information     Row Name 03/10/21 0919          OT Time and Intention    Document Type  evaluation  -     Mode of Treatment  occupational therapy;individual therapy  -     Row Name 03/10/21 0919          General Information    Patient Profile Reviewed  yes  -HK     Prior Level of Function  independent:;all household mobility;community mobility;gait;transfer;bed mobility;ADL's;driving;using stairs;work  -     Existing Precautions/Restrictions  other (see comments) Decreased L UE sensation  -     Row Name 03/10/21 0919          Living Environment    Lives With  child(tessa), dependent;parent(s);spouse Lives with wife, mother, 2 year old, and 11 mo old.  -     Row Name 03/10/21 0919          Home Main Entrance    Number of Stairs, Main Entrance  three  -HK     Stair Railings, Main Entrance  railing on left side (ascending)  -     Row Name 03/10/21 0919          Stairs Within Home, Primary    Number of Stairs, Within Home, Primary  none  -HK     Stair Railings, Within Home, Primary  none  -HK     Stairs Comment, Within Home, Primary  Pt reports he lives in [facility] with wife, mother, and kids. Pt reports walk in shower.  -     Row Name 03/10/21 0919          Cognition    Orientation Status (Cognition)  oriented x 4;verbal cues/prompts needed for orientation extra time for month  -     Row Name 03/10/21 0919          Safety Issues, Functional Mobility    Safety Issues Affecting Function (Mobility)  safety precaution awareness;safety precautions follow-through/compliance  -     Impairments Affecting  Function (Mobility)  balance;sensation/sensory awareness;strength  -       User Key  (r) = Recorded By, (t) = Taken By, (c) = Cosigned By    Initials Name Provider Type    HK Chrystal Mckay, OT Occupational Therapist          Mobility/ADL's     Row Name 03/10/21 0921          Bed Mobility    Bed Mobility  scooting/bridging;supine-sit  -HK     Scooting/Bridging [place] (Bed Mobility)  modified independence  -HK     Supine-Sit [place] (Bed Mobility)  modified independence  -HK     Bed Mobility, Safety Issues  decreased use of arms for pushing/pulling;decreased use of legs for bridging/pushing  -     Assistive Device (Bed Mobility)  head of bed elevated  -HK     Comment (Bed Mobility)  Pt advanced to EOB with no assist from OT.  -     Row Name 03/10/21 0921          Transfers    Transfers  sit-stand transfer;toilet transfer  -     Sit-Stand [place] (Transfers)  standby assist  -     [place] Level (Toilet Transfer)  independent  -     Assistive Device (Toilet Transfer)  raised toilet seat  -     Row Name 03/10/21 0921          Sit-Stand Transfer    Assistive Device (Sit-Stand Transfers)  other (see comments) none  -     Row Name 03/10/21 0921          Toilet Transfer    Type (Toilet Transfer)  sit-stand;stand-sit  -     Row Name 03/10/21 0921          Functional Mobility    Functional Mobility- Ind. Level  supervision required  -     Functional Mobility- Device  -- none  -     Functional Mobility-Distance (Feet)  20  -HK     Functional Mobility- Safety Issues  balance decreased during turns;step length decreased  -     Row Name 03/10/21 0921          Activities of Daily Living    BADL Assessment/Intervention  lower body dressing;toileting  -     Row Name 03/10/21 0921          Lower Body Dressing Assessment/Training    [place] Level (Lower Body Dressing)  doff;don;socks;independent  -     Position (Lower Body Dressing)  edge of bed sitting  -     Comment (Lower Body  Dressing)  extra time/effort  -     Row Name 03/10/21 0921          Toileting Assessment/Training    [place] Level (Toileting)  perform perineal hygiene;adjust/manage clothing;independent  -     Position (Toileting)  unsupported sitting;unsupported standing  -       User Key  (r) = Recorded By, (t) = Taken By, (c) = Cosigned By    Initials Name Provider Type     Chrystal Mckay, OT Occupational Therapist        Obj/Interventions     Row Name 03/10/21 0940          Sensory Assessment (Somatosensory)    Sensory Assessment (Somatosensory)  left UE  -HK     Left UE Sensory Assessment  light touch awareness;general sensation;light touch localization  -     Sensory Subjective Reports  insensate complains of dullness  -     Row Name 03/10/21 0940          Sensory Interventions    Comment, Sensory Intervention  Pt presents with decreased FM coordination in L hand as well as L hand weakness. OT issued yellow foam block and hand out on exercise. OT also issued hand outs on coordination exercise for L UE.  -     Row Name 03/10/21 0940          Vision Assessment/Intervention    Visual Impairment/Limitations  WFL  -     Row Name 03/10/21 0940          Range of Motion Comprehensive    General Range of Motion  no range of motion deficits identified  -     Comment, General Range of Motion  B UE WFL  -     Row Name 03/10/21 0940          Strength Comprehensive (MMT)    General Manual Muscle Testing (MMT) Assessment  upper extremity strength deficits identified  -     Row Name 03/10/21 0940          Upper Extremity (Manual Muscle Testing)    Upper Extremity: Manual Muscle Testing (MMT)  left shoulder strength deficit  -     Row Name 03/10/21 0940          Balance    Balance Assessment  sitting static balance;sitting dynamic balance;standing static balance;standing dynamic balance  -     Static Sitting Balance  WNL;unsupported;sitting, edge of bed  -     Dynamic Sitting Balance  WFL;unsupported;sitting,  edge of bed  -HK     Static Standing Balance  unsupported;standing  -HK     Dynamic Standing Balance  WFL;unsupported;standing  -HK     Row Name 03/10/21 0940          Left Shoulder (Manual Muscle Testing)    Left Shoulder Manual Muscle Testing (MMT)  flexion  -HK     MMT: Flexion, Left Shoulder  flexion  -HK     MMT, Gross Movement: Left Shoulder Flexion  (4/5) good  -HK       User Key  (r) = Recorded By, (t) = Taken By, (c) = Cosigned By    Initials Name Provider Type     Chrystal Mckya OT Occupational Therapist        Goals/Plan     Row Name 03/10/21 0987          Transfer Goal 1 (OT)    Activity/Assistive Device (Transfer Goal 1, OT)  sit-to-stand/stand-to-sit;toilet  -HK     [place] Level/Cues Needed (Transfer Goal 1, OT)  independent  -HK     Time Frame (Transfer Goal 1, OT)  by discharge  -HK     Progress/Outcome (Transfer Goal 1, OT)  goal ongoing  -HK     Row Name 03/10/21 0951          ROM Goal 1 (OT)    ROM Goal 1 (OT)  Pt will be able to adequately demosntrate foam block and coordination exercises with L UE.  -HK     Time Frame (ROM Goal 1, OT)  by discharge  -HK     Progress/Outcome (ROM Goal 1, OT)  goal ongoing  -HK       User Key  (r) = Recorded By, (t) = Taken By, (c) = Cosigned By    Initials Name Provider Type     Chrystal Mckay OT Occupational Therapist        Clinical Impression     Row Name 03/10/21 0954          Pain Assessment    Additional Documentation  Pain Scale: Numbers Pre/Post-Treatment (Group)  -HK     Row Name 03/10/21 0967          Pain Scale: Numbers Pre/Post-Treatment    Pretreatment Pain Rating  3/10  -HK     Posttreatment Pain Rating  3/10  -HK     Pain Location - Side  Left  -HK     Pain Location - Orientation  posterior  -HK     Pain Location  hand  -HK     Pain Intervention(s)  Ambulation/increased activity;Repositioned  -HK     Row Name 03/10/21 0913          Plan of Care Review    Plan of Care Reviewed With  patient  -HK     Progress  improving  -HK     Outcome  Summary  OT eval complete. Pt completes bed mobility and CI and transfers with SBA and no AE. Pt ambulates with supervision and no AE. Pt independent with LBD and toileting this date. Pt does demosntrate L UE weakness and decreased L hand FM coordination. OT issued foam blocks as well as strengthening hand out and coordination exercises to improve strength and coordination in LUE. Recommend d/c home with OP OT services.  -     Row Name 03/10/21 0943          Therapy Assessment/Plan (OT)    Patient/Family Therapy Goal Statement (OT)  Pt would like to improve and return home.  -HK     Rehab Potential (OT)  good, to achieve stated therapy goals  -     Criteria for Skilled Therapeutic Interventions Met (OT)  yes;skilled treatment is necessary  -HK     Therapy Frequency (OT)  daily  -     Row Name 03/10/21 0943          Therapy Plan Review/Discharge Plan (OT)    Anticipated Discharge Disposition (OT)  home with assist;home with outpatient therapy services  -     Row Name 03/10/21 0943          Vital Signs    Pre Systolic BP Rehab  128  -HK     Pre Treatment Diastolic BP  76  -HK     Post Systolic BP Rehab  138  -HK     Post Treatment Diastolic BP  86  -HK     O2 Delivery Pre Treatment  room air  -HK     O2 Delivery Intra Treatment  room air  -HK     O2 Delivery Post Treatment  room air  -HK     Pre Patient Position  Supine  -HK     Intra Patient Position  Standing  -HK     Post Patient Position  Sitting  -HK     Row Name 03/10/21 0943          Positioning and Restraints    Pre-Treatment Position  in bed  -HK     Post Treatment Position  chair  -HK     In Chair  notified nsg;reclined;call light within reach;encouraged to call for assist RN d/c chair alarm  -HK       User Key  (r) = Recorded By, (t) = Taken By, (c) = Cosigned By    Initials Name Provider Type     Chrystal Mckay, OT Occupational Therapist        Outcome Measures     Row Name 03/10/21 0952          How much help from another is currently needed...     Putting on and taking off regular lower body clothing?  4  -HK     Bathing (including washing, rinsing, and drying)  3  -HK     Toileting (which includes using toilet bed pan or urinal)  4  -HK     Putting on and taking off regular upper body clothing  4  -HK     Taking care of personal grooming (such as brushing teeth)  3  -HK     Eating meals  3  -HK     AM-PAC 6 Clicks Score (OT)  21  -HK     Row Name 03/10/21 0952          Functional Assessment    Outcome Measure Options  AM-PAC 6 Clicks Daily Activity (OT)  -HK       User Key  (r) = Recorded By, (t) = Taken By, (c) = Cosigned By    Initials Name Provider Type    HK Chrystal Mckay, OT Occupational Therapist        Occupational Therapy Education                 Title: PT OT SLP Therapies (Done)     Topic: Occupational Therapy (Done)     Point: ADL training (Done)     Description:   Instruct learner(s) on proper safety adaptation and remediation techniques during self care or transfers.   Instruct in proper use of assistive devices.              Learning Progress Summary           Patient Acceptance, E,TB,D, VU,NR,DU by  at 3/10/2021 0952                   Point: Home exercise program (Done)     Description:   Instruct learner(s) on appropriate technique for monitoring, assisting and/or progressing therapeutic exercises/activities.              Learning Progress Summary           Patient Acceptance, E,TB,D, VU,NR,DU by  at 3/10/2021 0952                   Point: Precautions (Done)     Description:   Instruct learner(s) on prescribed precautions during self-care and functional transfers.              Learning Progress Summary           Patient Acceptance, E,TB,D, VU,NR,DU by  at 3/10/2021 0952                   Point: Body mechanics (Done)     Description:   Instruct learner(s) on proper positioning and spine alignment during self-care, functional mobility activities and/or exercises.              Learning Progress Summary           Patient Acceptance,  E,TB,D, VU,NR,DU by  at 3/10/2021 0952                               User Key     Initials Effective Dates Name Provider Type [facility] 03/07/18 -  Chrystal Mckay OT Occupational Therapist OT              OT Recommendation and Plan  Therapy Frequency (OT): daily  Plan of Care Review  Plan of Care Reviewed With: patient  Progress: improving  Outcome Summary: OT eval complete. Pt completes bed mobility and CI and transfers with SBA and no AE. Pt ambulates with supervision and no AE. Pt independent with LBD and toileting this date. Pt does demosntrate L UE weakness and decreased L hand FM coordination. OT issued foam blocks as well as strengthening hand out and coordination exercises to improve strength and coordination in LUE. Recommend d/c home with OP OT services.     Time Calculation:   Time Calculation- OT     Row Name 03/10/21 0829             Time Calculation- OT    OT Start Time  0829  -      OT Received On  03/10/21  -      OT Goal Re-Cert Due Date  03/20/21  -         Timed Charges    63081 - OT Therapeutic Exercise Minutes  10  -HK        User Key  (r) = Recorded By, (t) = Taken By, (c) = Cosigned By    Initials Name Provider Type     Chrystal Mckay OT Occupational Therapist        Therapy Charges for Today     Code Description Service Date Service Provider Modifiers Qty    17119436619  OT THER PROC EA 15 MIN 3/10/2021 Chrystal Mckay OT GO 1    [id number]  OT EVAL LOW COMPLEXITY 3 3/10/2021 Chrystal Mckay OT GO 1               Chrystal Mckay OT  3/10/2021

## 2021-03-10 NOTE — THERAPY EVALUATION
Acute Care - Speech Language Pathology Initial Evaluation  [facility]   Cognitive-Communication Evaluation     Patient Name: Luis Darby  : 1995  MRN: [id number]  Today's Date: 3/10/2021               Admit Date: 3/10/2021     Visit Dx:  No diagnosis found.  Patient Active Problem List   Diagnosis   • Left sided numbness     Past Medical History:   Diagnosis Date   • Anxiety    • Depression    • Intermittent explosive disorder in adult      No past surgical history on file.     SLP EVALUATION (last 72 hours)      SLP SLC Evaluation     Row Name 03/10/21 0930       Communication Assessment/Intervention    Document Type  discharge evaluation/summary  (Pended)   -EN    Subjective Information  no complaints  (Pended)   -EN    Patient Observations  alert;cooperative;agree to therapy  (Pended)   -EN    Patient/Family/Caregiver Comments/Observations  no family present   (Pended)   -EN    Care Plan Review  evaluation/treatment results reviewed;care plan/treatment goals reviewed;risks/benefits reviewed;current/potential barriers reviewed;patient/other agree to care plan  (Pended)   -EN    Patient Effort  good  (Pended)   -EN    Symptoms Noted During/After Treatment  none  (Pended)   -EN       General Information    Patient Profile Reviewed  yes  (Pended)   -EN    Pertinent History Of Current Problem  Adm. w/ L. side numbness. PMH significant for intermittent explosive disorder, anxiety, tobacco abuse (2ppd), atypical chest pain, and depression  (Pended)   -EN    Precautions/Limitations, Vision  WFL;for purposes of eval  (Pended)   -EN    Precautions/Limitations, Hearing  WFL;for purposes of eval  (Pended)   -EN    Prior Level of Function-Communication  WFL  (Pended)   -EN    Plans/Goals Discussed with  patient;agreed upon  (Pended)   -EN    Barriers to Rehab  none identified  (Pended)   -EN    Patient's Goals for Discharge  return to all previous roles/activities  (Pended)   -EN       Pain    Additional  Documentation  Pain Scale: FACES Pre/Post-Treatment (Group)  (Pended)   -EN       Pain Scale: FACES Pre/Post-Treatment    Pain: FACES Scale, Pretreatment  0-->no hurt  (Pended)   -EN    Posttreatment Pain Rating  0-->no hurt  (Pended)   -EN       Comprehension Assessment/Intervention    Comprehension Assessment/Intervention  Auditory Comprehension  (Pended)   -EN       Auditory Comprehension Assessment/Intervention    Auditory Comprehension (Communication)  WFL  (Pended)   -EN       Expression Assessment/Intervention    Expression Assessment/Intervention  verbal expression  (Pended)   -EN       Verbal Expression Assessment/Intervention    Verbal Expression  WFL  (Pended)   -EN       Oral Motor Structure and Function    Oral Motor Structure and Function  WFL  (Pended)   -EN    Dentition Assessment  missing teeth  (Pended)   -EN       Oral Musculature and Cranial Nerve Assessment    Oral Motor General Assessment  WFL  (Pended)   -EN       Motor Speech Assessment/Intervention    Motor Speech Function  WFL  (Pended)   -EN       Cursory Voice Assessment/Intervention    Quality and Resonance (Voice)  WFL  (Pended)   -EN       Cognitive Assessment Intervention- SLP    Cognitive Function (Cognition)  WFL  (Pended)   -EN       SLP Clinical Impressions    SLP Diagnosis  Speech, language, and cognitive communication within functional limits; skilled SLP intervention not needed at this time.   (Pended)   -EN    SLC Criteria for Skilled Therapy Interventions Met  no problems identified which require skilled intervention  (Pended)   -EN    Functional Impact  no impact on function  (Pended)   -EN    Plan for Continued Treatment (SLP)  Sign off as pt has speech, language, and cognition that are within functional limits. Pt does not require skilled SLP intervention at this time.   (Pended)   -EN       Recommendations    Therapy Frequency (SLP SLC)  evaluation only  (Pended)   -EN    Anticipated Discharge Disposition (SLP)  home   (Pended)   -EN       SLP Discharge Summary    Discharge Destination  home  (Pended)   -EN    Discharge Diagnostic Statement  Sign off as pt has speech, language, and cognition that are within functional limits. Pt does not require skilled SLP intervention at this time.   (Pended)   -EN    Progress Toward Achieving Short/long Term Goals  discharge on same date as initial evaluation  (Pended)   -EN    Reason for Discharge  all goals and outcomes met, no further needs identified  (Pended)   -EN      User Key  (r) = Recorded By, (t) = Taken By, (c) = Cosigned By    Initials Name Effective Dates    Anahy Blackmon, Speech Therapy Student 01/08/21 -              EDUCATION  The patient has been educated in the following areas:     Cognitive Impairment Communication Impairment.    SLP Recommendation and Plan  SLP Diagnosis: (P) Speech, language, and cognitive communication within functional limits; skilled SLP intervention not needed at this time.   Reason for Discharge: (P) all goals and outcomes met, no further needs identified     SLC Criteria for Skilled Therapy Interventions Met: (P) no problems identified which require skilled intervention  Anticipated Discharge Disposition (SLP): (P) home              Plan for Continued Treatment (SLP): (P) Sign off as pt has speech, language, and cognition that are within functional limits. Pt does not require skilled SLP intervention at this time.                     Time Calculation:     Time Calculation- SLP     Row Name 03/10/21 1019             Time Calculation- SLP    SLP Start Time  0930  (Pended)   -EN      SLP Received On  03/10/21  (Pended)   -EN        User Key  (r) = Recorded By, (t) = Taken By, (c) = Cosigned By    Initials Name Provider Type    Anahy Blackmon, Speech Therapy Student Speech Therapy Student          Therapy Charges for Today     Code Description Service Date Service Provider Modifiers Qty    [id number]  ST EVAL SPEECH AND PROD W LANG  3  3/10/2021 Anahy Araya, Speech Therapy Student GN 1            Patient was not wearing a face mask and did not exhibit coughing during this therapy encounter.  Procedure performed was aerosolizing, involved close contact (within 6 feet for at least 15 minutes or longer), and did not involve contact with infectious secretions or specimens.  Therapist used appropriate personal protective equipment including gloves, standard procedure mask and eye protection.  Appropriate PPE was worn during the entire therapy session.  Hand hygiene was completed before and after therapy session.  Phyllis Verma was also present during this encounter and the aforementioned applies to them, as well.            Anahy Araya, Speech Therapy Student  3/10/2021

## 2021-03-10 NOTE — NURSING NOTE
"  ACC REVIEW REPORT: [facility]        PATIENT NAME: Luis Darby    PATIENT ID: [id number]      COVID-19 ACC SCREENING       DOES THE PATIENT HAVE A FEVER GREATER THAN OR EQUAL .4: no    IS THE PATIENT EXPERIENCING SHORTNESS OF BREATH: no    DOES THE PATIENT HAVE A COUGH: no    DOES THE PATIENT HAVE ANY OF THE FOLLOWING RISK FACTORS:    EXPOSURE TO SUSPECTED OR KNOWN COVID-19: no    RECENT TRAVEL HISTORY TO ENDEMIC AREA (DOMESTIC/LOCAL): no    IS THE PATIENT A HEALTHCARE WORKER: no    HAS THE PATIENT EXPERIENCED A LOSS OF SENSE OF TASTE OR SMELL:  no    HAS THE PATIENT BEEN TESTED FOR COVID-19: no    DATE TESTED: na    LAB TESTING SENT TO: na      BED: s303    BED TYPE: tele    BED GIVEN TO: Lida Kerr RN    TIME BED GIVEN: 0217    TODAY'S DATE: 3/10/2021    TRANSFER DATE: 3/10/2021     ETA: pending ground transport    TRANSFERRING FACILITY: [facility]    TRANSFERRING FACILITY PHONE # : [phone]    TRANSFERRING MD: Dr Castaneda    ACCEPTING PROVIDER: NAVIGATOR    NEUROLOGY PHYSICIAN: Reagan    DATE/TIME REQUEST RECEIVED: 3/10/2021 at 01:49    REPORT FROM: Lida Kerr RN    TIME REPORT TAKEN: 02:17    DIAGNOSIS: CVA    REASON FOR TRANSFER TO [facility]: higher level of care    CLINICAL INFORMATION    HEIGHT: 72\"    WEIGHT: 299 lb    ALLERGIES: NKDA    VITAL SIGNS:   TIME: 0215  TEMP: 98  PULSE: 69  B/P: 126/58  RESP: 18      MEDS/IV FLUIDS: 18 L AC - saline locked      CARDIAC SYSTEM:    CHEST PAIN: no    RHYTHM: NSR    Is patient taking or has patient been given any drugs that could increase bleeding? no    CARDIAC NOTES:        RESPIRATORY SYSTEM:    LUNG SOUNDS: clear    OXYGEN: 2L NC    O2 SAT: 99%    RESPIRATORY STATUS: smoker - 2 PPD      CNS/MUSCULOSKELETAL      GILBERTO COMA SCALE:    E: 4  M: 6  V: 5    LAST KNOWN WELL: 22:45 on 3/9/2021          NIHSS    Survey Item  0: Means Alert  1: Drowsy or Answer Correctly  2: Incorrect, Forced, Can't Resist Gravity  3: Complete or " No Effort  4: No Movement  NT: Not Testable Acceptable As Noted Above      1A: Level of Consciousness: 0    1B: LOC Questions (month, age) : 0    1C: LOC Commands (open/close eyes, make a fist & let go): 0    2:  Best Gaze (eyes open-pt follows examiner's fingers or face): 0    3:  Visual (introduce visual stimulus/threat to pt's visual field quad. Cover 1 eye and hold up fingers in all 4 quadrants) : 0    4.  Facial Palsy (show teeth, raise eyebrows and squeeze eyes tightly shut): 0    5A: Motor Arm-Left (elevate extremity to 90 degrees and score drift/movement.  Count to 10 aloud and use fingers for visual cue): 0    5B:  Motor Arm-Right (elevate extremity to 90 degrees and score drift/movement.  Count to 10 aloud and use fingers for visual cue): 0    6A:  Motor Leg-Left (elevate extremity to 30 degrees and score drift/movement.  Count to 5 out loud and use fingers for visual cue): 0    6B:  Motor Leg-Right (elevate extremity to 30 degrees and score drift/movement.  Count to 5 out loud and use fingers for visual cue): 0    7:  Limb Ataxia- finger to nose, heel down shin: 0    8:  Sensory- pin prick to face, arms, trunk, and legs. Compare sharpness side to side: 1    9:  Best Language- name, items, describe picture, and read sentences.  Do not forget glasses if they normally wear them: 0    10: Dysarthria- elevate speech clarity by pt reading or repeating words on a list: 0    11: Extinction and Inattention- Use information from prior testing or double simultaneous stimuli testing to identify neglect. Face, arms, legs and visual field: 0    Total NIHSS Score: 1  Date: 3/10/2021  Time of NIHSS Assessment: 01:20      CAT SCAN RESULTS: negative for acute    CNS/MUSCULOSKELETAL NOTES: left side numbness & weakness      GI//GY      ABDOMINAL PAIN: no    VOMITING: no    DIARRHEA: no    NAUSEA: no    GI//GY NOTES: continent x2    PAST MEDICAL HISTORY: smoker 2 PPD, intermittent explosive disorder, learning disability        Darcie Haq RN  3/10/2021  02:26 EST

## 2021-03-10 NOTE — CONSULTS
Stroke Consult Note    Patient Name: Luis Darby   MRN: [id number]  Age: 25 y.o.  Sex: male  : 1995    Primary Care Physician: Forrest Lo PA  Referring Physician:  Reba Seymour MD    TIME STROKE TEAM CALLED: 352 EST     TIME PATIENT SEEN: 0400 EST    Handedness: Right  Race:     Chief Complaint/Reason for Consultation: Left sided numbness and weakness    Subjective .  HPI: Luis Darby is a 25 yr old male with a PMH significant for intermittent explosive disorder (on Trileptal and Lexapro), anxiety, depression, tobacco abuse (2ppd) and atypical chest pain that presents to [facility] via EMS transfer from [facility] with c/o left sided weakness and sensory loss. His LKW was 2245. The patient was driving his truck and noted his LUE began to feel numb/tingly. He had difficulty shifting gears and noticed that his LLE was weak. He was unable to use the clutch at that time. He went to the OSH for further evaluation. NIH there was a 1. CTH was negative for any acute intracranial abnormalities. He was transferred to [facility] for further evaluation.     On arrival to [facility], he is alert and oriented x4. /77. Speech is fluent. JACKSON. LUE/LLE strength 4/5.  R>L.  RUE/RLE 5/5. Sensory deficit noted in his LUE/LLE. No facial asymmetry noted. Poor dentition noted. NIH 2. He reports a recent increase in his Trileptal. He denies any sick contacts or known exposure to Covid 19.     wn Normal Date/Time: 2245 3/9/2021 EST     Review of Systems   HENT: Negative.    Respiratory: Negative.    Cardiovascular: Negative.    Gastrointestinal: Negative.    Endocrine: Negative.    Genitourinary: Negative.    Musculoskeletal: Negative.    Skin: Negative.    Neurological: Positive for weakness and numbness.   Hematological: Negative.    Psychiatric/Behavioral: Negative.       Past Medical History:   Diagnosis Date   • Anxiety    • Depression    • Intermittent explosive disorder in adult      No past surgical history  on file.  No family history on file.  Social History     Socioeconomic History   • Marital status:      Spouse name: Not on file   • Number of children: Not on file   • Years of education: Not on file   • Highest education level: Not on file   Tobacco Use   • Smoking status: Current Every Day Smoker     Packs/day: 2.00   Substance and Sexual Activity   • Alcohol use: Yes     Comment: occasionally   • Drug use: Never     No Known Allergies  Prior to Admission medications    Medication Sig Start Date End Date Taking? Authorizing Provider   buPROPion (WELLBUTRIN) 100 MG tablet Take 100 mg by mouth 2 (Two) Times a Day.    ProviderJonelle MD   diclofenac (VOLTAREN) 75 MG EC tablet Take 1 tablet by mouth 2 (Two) Times a Day As Needed (pain). 9/20/20   Migue Ramirez II, PA   sertraline (ZOLOFT) 100 MG tablet Take 100 mg by mouth Daily.    ProviderJonelle MD   TiZANidine (ZANAFLEX) 4 MG capsule Take 1 capsule by mouth 2 (Two) Times a Day As Needed for Muscle Spasms. 9/20/20   Migue Ramirez II, PA             Objective     Temp:  [97.5 °F (36.4 °C)] 97.5 °F (36.4 °C)  Heart Rate:  [69] 69  Resp:  [20] 20  BP: (145)/(77) 145/77  Neurological Exam  Mental Status  Awake and alert. Oriented to person, place, time and situation. Speech is normal. Language is fluent with no aphasia.    Cranial Nerves  CN II: Visual acuity is normal. Visual fields full to confrontation.  CN III, IV, VI: Extraocular movements intact bilaterally. Normal lids and orbits bilaterally. Pupils equal round and reactive to light bilaterally.  CN V: Facial sensation is normal.  CN VII: Full and symmetric facial movement.  CN IX, X: Palate elevates symmetrically. Normal gag reflex.  CN XI: Shoulder shrug strength is normal.  CN XII: Tongue midline without atrophy or fasciculations.    Motor   LLE drift.  JACKSON. LUE/LLE strength 4/5. RUE/RLE 5/5. Sensory deficit noted in his LUE/LLE.    Sensory  Light touch abnormality: LUE/LLE.     Reflexes                                            Right                      Left  Biceps                                 2+                         2+  Patellar                                2+                         1+  Plantar                           Downgoing                Downgoing    Coordination  Right: Finger-to-nose normal. Heel-to-shin normal.  Left: Finger-to-nose normal.    Gait  Casual gait is normal including stance, stride, and arm swing.      Physical Exam  Constitutional:       General: He is awake.      Appearance: He is obese.   HENT:      Head: Normocephalic and atraumatic.      Mouth/Throat:      Comments: Poor dentition  Eyes:      General: Lids are normal.      Extraocular Movements: Extraocular movements intact.      Pupils: Pupils are equal, round, and reactive to light.   Cardiovascular:      Rate and Rhythm: Normal rate and regular rhythm.   Pulmonary:      Effort: Pulmonary effort is normal.      Breath sounds: Normal breath sounds.   Abdominal:      General: Bowel sounds are normal.   Musculoskeletal:         General: Normal range of motion.      Cervical back: Normal range of motion and neck supple.   Skin:     General: Skin is warm and dry.      Capillary Refill: Capillary refill takes less than 2 seconds.   Neurological:      Mental Status: He is alert and oriented to person, place, and time.      Sensory: Sensory deficit present.      Motor: Weakness present.      Deep Tendon Reflexes:      Reflex Scores:       Bicep reflexes are 2+ on the right side and 2+ on the left side.       Patellar reflexes are 2+ on the right side and 1+ on the left side.  Psychiatric:         Mood and Affect: Mood normal.         Speech: Speech normal.         Behavior: Behavior normal.         Thought Content: Thought content normal.         Judgment: Judgment normal.         Acute Stroke Data    Alteplase (tPA) Inclusion / Exclusion Criteria    Time: 0400 EST  Person Administering Scale: Naomy Real,  APRN    Inclusion Criteria  [x]   18 years of age or greater   []   Onset of symptoms < 4.5 hours before beginning treatment (stroke onset = time patient was last seen well or without symptoms).   []   Diagnosis of acute ischemic stroke causing measurable disabling deficit (Complete Hemianopia, Any Aphasia, Visual or Sensory Extinction, Any weakness limiting sustained effort against gravity)   []   Any remaining deficit considered potentially disabling in view of patient and practitioner   Exclusion criteria (Do not proceed with Alteplase if any are checked under exclusion criteria)  [x]   Onset unknown or GREATER than 4.5 hours   []   ICH on CT/MRI   []   CT demonstrates hypodensity representing acute or subacute infarct   []   Significant head trauma or prior stroke in the previous 3 months   []   Symptoms suggestive of subarachnoid hemorrhage   []   History of un-ruptured intracranial aneurysm GREATER than 10 mm   []   Recent intracranial or intraspinal surgery within the last 3 months   []   Arterial puncture at a non-compressible site in the previous 7 days   []   Active internal bleeding   []   Acute bleeding tendency   []   Platelet count LESS than 100,000 for known hematological diseases such as leukemia, thrombocytopenia or chronic cirrhosis   []   Current use of anticoagulant with INR GREATER than 1.7 or PT GREATER than 15 seconds, aPTT GREATER than 40 seconds   []   Heparin received within 48 hours, resulting in abnormally elevated aPTT GREATER than upper limit of normal   []   Current use of direct thrombin inhibitors or direct factor Xa inhibitors in the past 48 hours   []   Elevated blood pressure refractory to treatment (systolic GREATER than 185 mm/Hg or diastolic  GREATER than 110 mm/Hg   []   Suspected infective endocarditis and aortic arch dissection   []   Current use of therapeutic treatment dose of low-molecular-weight heparin (LMWH) within the previous 24 hours   []   Structural GI malignancy  or bleed   Relative exclusion for all patients  []   Only minor non-disabling symptoms   []   Pregnancy   []   Seizure at onset with postictal residual neurological impairments   []   Major surgery or previous trauma within past 14 days   []   History of previous spontaneous ICH, intracranial neoplasm, or AV malformation   []   Postpartum (within previous 14 days)   []   Recent GI or urinary tract hemorrhage (within previous 21 days)   []   Recent acute MI (within previous 3 months)   []   History of un-ruptured intracranial aneurysm LESS than 10 mm   []   History of ruptured intracranial aneurysm   []   Blood glucose LESS than 50 mg/dL (2.7 mmol/L)   []   Dural puncture within the last 7 days   []   Known GREATER than 10 cerebral microbleeds   Additional exclusions for patients with symptoms onset between 3 and 4.5 hours.  []   Age > 80.   []   On any anticoagulants regardless of INR  >>> Warfarin (Coumadin), Heparin, Enoxaparin (Lovenox), fondaparinux (Arixtra), bivalirudin (Angiomax), Argatroban, dabigatran (Pradaxa), rivaroxaban (Xarelto), or apixaban (Eliquis)   []   Severe stroke (NIHSS > 25).   []   History of BOTH diabetes and previous ischemic stroke.   []   The risks and benefits have been discussed with the patient or family related to the administration of IV Alteplase for stroke symptoms.   []   I have discussed and reviewed the patient's case and imaging with the attending prior to IV Alteplase.    Time Alteplase administered       Hospital Meds:  Scheduled- amoxicillin-clavulanate, 1 tablet, Oral, Q12H  aspirin, 325 mg, Oral, Daily   Or  aspirin, 300 mg, Rectal, Daily  atorvastatin, 80 mg, Oral, Nightly  sodium chloride, 10 mL, Intravenous, Q12H  sodium chloride, 10 mL, Intravenous, Q12H      Infusions-     PRNs- sodium chloride  •  sodium chloride    Functional Status Prior to Current Stroke/[place] Score: 0    NIH Stroke Scale  Time: 04:00 EST  Person Administering Scale: Naomy Real,  APRN    1a  Level of consciousness: 0=alert; keenly responsive   1b. LOC questions:  0=Performs both tasks correctly   1c. LOC commands: 0=Performs both tasks correctly   2.  Best Gaze: 0=normal   3.  Visual: 0=No visual loss   4. Facial Palsy: 0=Normal symmetric movement   5a.  Motor left arm: 0=No drift, limb holds 90 (or 45) degrees for full 10 seconds   5b.  Motor right arm: 0=No drift, limb holds 90 (or 45) degrees for full 10 seconds   6a. motor left le=Drift, limb holds 90 (or 45) degrees but drifts down before full 10 seconds: does not hit bed   6b  Motor right le=No drift, limb holds 90 (or 45) degrees for full 10 seconds   7. Limb Ataxia: 0=Absent   8.  Sensory: 1=Mild to moderate sensory loss; patient feels pinprick is less sharp or is dull on the affected side; there is a loss of superficial pain with pinprick but patient is aware He is being touched   9. Best Language:  0=No aphasia, normal   10. Dysarthria: 0=Normal   11. Extinction and Inattention: 0=No abnormality    Total:   2       Results Reviewed:  CT Cerebral Perfusion With & Without Contrast    Result Date: 3/10/2021  Normal brain perfusion CT. Signer Name: Christiano Chamorro MD  Signed: 3/10/2021 4:52 AM  Workstation Name: [id number]  [facility]                    Assessment/Plan:      Mr. Darby is a 25 yr old right handed male with a PMH significant for intermittent explosive disorder, anxiety, tobacco abuse (2ppd), atypical chest pain, and depression who came to [facility] with c/o an acute onset of left sided  weakness/numbness that began at 2245 on 3/09/2021. He initially went to [facility]. [facility] there was 1. CTH negative. He was transferred to [facility] for further evaluation.     Left sided numbness/weakness  -Possible TIA or stroke  -NIH 2  -CTH negative for any acute intracranial abnormalities per OSH report  -CTP with normal brain perfusion  -CTA H/N with no flow limiting stenosis, occlusion, or  aneurysm  -MRI pending  -Echo  -ASA  -FLP in the am. Statin  -HgA1c in the am  -Npo until bedside dysphagia is passed then healthy heart diet  -Permissive HTN overnight  -Smoking cessation counseling  -PT/OT/SLP  -Intermittent explosive disorder; continue home Trileptal and Lexapro    Plan discussed with Dr. Seymour, the patient, and nursing staff. Please call with any questions or concerns.     Naomy Real, APRN  March 10, 2021  04:53 EST

## 2021-03-10 NOTE — PROGRESS NOTES
[facility] Medicine Services  ADMISSION FOLLOW-UP NOTE          Patient admitted after midnight, H&P by my partner performed earlier on today's date reviewed.  Interim findings, labs, and charting also reviewed.        [facility] Hospital Problem List has been managed and updated to include any new diagnoses:  Active Hospital Problems    Diagnosis  POA   • **Left sided numbness [R20.0]  Yes      Resolved Hospital Problems   No resolved problems to display.         ADDITIONAL PLAN:  - detailed assessment and plan from admission reviewed  - MRI is without acute abnormality  - Neurology consulted and following  - dysphagia eval pending  - workup for possible TIA in progress including echo    Gemma Hutchins MD  03/10/21

## 2021-03-10 NOTE — DISCHARGE INSTR - APPOINTMENTS
[place], outpatient rehab will contact you for an appointment. Tele [phone]

## 2021-03-10 NOTE — DISCHARGE SUMMARY
"    [facility] Medicine Services  DISCHARGE SUMMARY    Patient Name: Luis Darby  : 1995  MRN: [id number]    Date of Admission: 3/10/2021  3:52 AM  Date of Discharge:  03/10/21    Primary Care Physician: Forrest Lo PA    Consults     Date and Time Order Name Status Description    3/10/2021  4:41 AM Inpatient Neurology Consult Stroke Completed           Hospital Course     Presenting Problem:   Left sided numbness [R20.0]    Active Hospital Problems    Diagnosis  POA   • **Left sided numbness [R20.0]  Yes      Resolved Hospital Problems   No resolved problems to display.          Hospital Course:  Luis Darby is a 25 y.o. male admitted with left-sided numbness concerning for possible TIA versus CVA.  He was admitted and evaluated by our stroke team.    Per Dr. Marroquin's note from the neurology service consult:    \"25-year-old with known medical problem of smoking, depression, bipolar disorder, comes in with left hand and leg numbness.  No facial symptoms, no slurred speech, no visual changes.     Bedside neurological exam significant for decreased subjective sensation in left arm and leg, normal reflexes, normal neck movements.     Personally reviewed his imaging and labs, including CT head, CT angiogram, CTP and MRI brain all of which is negative.     Assessment and plan  #1.  Left-sided numbness.  No definitive diagnosis.  Recommend to get EMG/NCS if symptoms do not improve in next few weeks.  No further stroke work-up at this time.     #2.  Smoking.  Encouraged him to quit smoking, but patient denied.     #3.  Obesity.  Encouraged him to lose weight, and exercise regularly.     No further work-up at this time, can get up with nursing.  Okay to discharge him from our team standpoint.  We will sign off for now, please call us with questions.\"      Discharge Follow Up Recommendations for outpatient labs/diagnostics:  Follow-up with PCP next week to discuss left-sided numbness " episode.      Day of Discharge     HPI:   Patient is feeling better overall but does still have some residual left-sided numbness in his arm.  It is described as mild.  He is not particularly bothered by it at this point.  He was seen by neurology and Dr. Couch has left recommendations to encourage smoking cessation and lose weight and increase exercise.  Advised to follow-up with PCP and if should symptoms persist he may proceed with an EMG.    Review of Systems  Denies fever chills chest pain abdominal pain headache changes in vision or speech.    Vital Signs:   Temp:  [97.5 °F (36.4 °C)-98.2 °F (36.8 °C)] 98.2 °F (36.8 °C)  Heart Rate:  [69-77] 75  Resp:  [16-20] 16  BP: (128-145)/(76-80) 131/80     Physical Exam:  Constitutional: No acute distress, awake, alert, nontoxic, overweight body habitus  Eyes: Pupils equal, sclerae anicteric, no conjunctival injection  HENT: NCAT, mucous membranes moist  Neck: Supple, no thyromegaly, no lymphadenopathy  Respiratory: Clear to auscultation bilaterally, good effort, nonlabored respirations   Cardiovascular: RRR  Gastrointestinal: Positive bowel sounds, soft, nontender, nondistended  Musculoskeletal: No peripheral edema, normal muscle tone for age  Psychiatric: Appropriate affect, good insight and judgement, cooperative  Neurologic: Oriented x 3, movements symmetric BUE and BLE, Cranial Nerves grossly intact, speech clear and fluent; subjective tingling in the left arm  Skin: No rashes, no jaundice, no petechiae, no mottling      Pertinent  and/or Most Recent Results     LAB RESULTS:      Lab 03/10/21  0640   WBC 5.91   HEMOGLOBIN 14.7   HEMATOCRIT 43.7   PLATELETS 280   NEUTROS ABS 2.46   IMMATURE GRANS (ABS) 0.02   LYMPHS ABS 2.28   MONOS ABS 0.60   EOS ABS 0.46*   MCV 88.8         Lab 03/10/21  0640   SODIUM 137   POTASSIUM 3.3*   CHLORIDE 102   CO2 23.0   ANION GAP 12.0   BUN 13   CREATININE 0.87   GLUCOSE 105*   CALCIUM 8.9   HEMOGLOBIN A1C 5.10   TSH 4.760*         Lab  03/10/21  0640   TOTAL PROTEIN 6.4   ALBUMIN 4.40   GLOBULIN 2.0   ALT (SGPT) 51*   AST (SGOT) 26   BILIRUBIN 0.5   ALK PHOS 69             Lab 03/10/21  0640   CHOLESTEROL 158   LDL CHOL 60   HDL CHOL 19*   TRIGLYCERIDES 517*             Brief Urine Lab Results     None        Microbiology Results (last 10 days)     Procedure Component Value - Date/Time    COVID PRE-OP / PRE-PROCEDURE SCREENING ORDER (NO ISOLATION) - Swab, Nasopharynx [[id number]]  (Normal) Collected: 03/10/21 0635    Lab Status: Final result Specimen: Swab from Nasopharynx Updated: 03/10/21 0735    Narrative:      The following orders were created for panel order COVID PRE-OP / PRE-PROCEDURE SCREENING ORDER (NO ISOLATION) - Swab, Nasopharynx.  Procedure                               Abnormality         Status                     ---------                               -----------         ------                     COVID-19 and FLU A/B PCR...[[id number]]  Normal              Final result                 Please view results for these tests on the individual orders.    COVID-19 and FLU A/B PCR - Swab, Nasopharynx [[id number]]  (Normal) Collected: 03/10/21 0635    Lab Status: Final result Specimen: Swab from Nasopharynx Updated: 03/10/21 0735     COVID19 Not Detected     Influenza A PCR Not Detected     Influenza B PCR Not Detected    Narrative:      Fact sheet for providers: https://www.fda.gov/media/298781/download    Fact sheet for patients: https://www.fda.gov/media/402189/download    Test performed by PCR.          Adult Transthoracic Echo Complete W/ Cont if Necessary Per Protocol (With Agitated Saline)    Result Date: 3/10/2021  · Left ventricular ejection fraction appears to be 56 - 60%. Left ventricular systolic function is normal. · Left ventricular wall thickness is consistent with mild concentric hypertrophy. · Left ventricular diastolic function was normal. · The right ventricular cavity is mildly dilated. · Saline test results are negative.       CT Angiogram Neck    Result Date: 3/10/2021  CTA Neck, CTA Head INDICATION: Left-sided weakness since last night. TECHNIQUE: CT angiogram of the head and neck with and without IV contrast. 3-D reconstructions were obtained and reviewed.  Evaluation for a significant carotid arterial stenosis is based on the NASCET criteria. Radiation dose reduction techniques included automated exposure control or exposure modulation based on body size. Count of known CT and cardiac nuc med studies performed in previous 12 months: 0. COMPARISON: None available. FINDINGS: CTA neck:  Lung apices are clear. The aortic arch and great vessel origins are normal. Vertebral arteries are widely patent and codominant. Carotid arteries are also widely patent. No plaque disease is seen. There is no carotid or vertebral arterial dissection. CTA head:  The vertebrobasilar system is normal. The distal ICAs appear widely patent. The anterior, middle, and posterior cerebral arteries are widely patent as well. No flow limiting stenosis or vessel cut off is identified. There is no aneurysm. The major dural venous sinuses are patent. There is no pathologic enhancement in the brain.     Negative CT angiogram of the head and neck. Signer Name: Christiano Chamorro MD  Signed: 3/10/2021 5:18 AM  Workstation Name: [facility]  [facility]    MRI Brain Without Contrast    Result Date: 3/10/2021  MRI Brain WO INDICATION: Left-sided weakness since last night. TECHNIQUE: MRI of the brain without IV contrast. COMPARISON:  Head and neck CT angiogram same day FINDINGS: Diffusion images reveal no acute or subacute infarct. Ventricular size and configuration are normal. White matter signal is homogeneous and normal. No hemorrhage is identified. There are no masses. Major intracranial flow voids are maintained. Craniovertebral junction is normal.     Negative noncontrast brain MRI. Signer Name: Christiano Chamorro MD  Signed: 3/10/2021 5:22 AM   Workstation Name: [facility]  [facility]    CT Angiogram Head    Result Date: 3/10/2021  CTA Neck, CTA Head INDICATION: Left-sided weakness since last night. TECHNIQUE: CT angiogram of the head and neck with and without IV contrast. 3-D reconstructions were obtained and reviewed.  Evaluation for a significant carotid arterial stenosis is based on the NASCET criteria. Radiation dose reduction techniques included automated exposure control or exposure modulation based on body size. Count of known CT and cardiac nuc med studies performed in previous 12 months: 0. COMPARISON: None available. FINDINGS: CTA neck:  Lung apices are clear. The aortic arch and great vessel origins are normal. Vertebral arteries are widely patent and codominant. Carotid arteries are also widely patent. No plaque disease is seen. There is no carotid or vertebral arterial dissection. CTA head:  The vertebrobasilar system is normal. The distal ICAs appear widely patent. The anterior, middle, and posterior cerebral arteries are widely patent as well. No flow limiting stenosis or vessel cut off is identified. There is no aneurysm. The major dural venous sinuses are patent. There is no pathologic enhancement in the brain.     Negative CT angiogram of the head and neck. Signer Name: Christiano Chamorro MD  Signed: 3/10/2021 5:18 AM  Workstation Name: [facility]    CT Cerebral Perfusion With & Without Contrast    Result Date: 3/10/2021  CT CEREBRAL PERFUSION W WO CONTRAST HISTORY: Left-sided weakness since last night. TECHNIQUE: Axial CT images of the brain without and with intravenous contrast using cerebral perfusion protocol. Post-processing parametric maps were created using RAPID software and reviewed. Radiation dose reduction techniques included automated exposure control or exposure modulation based on body size. CT and nuclear cardiology exams in the last 12 months: 0. COMPARISON: None.  FINDINGS: Arterial input function is optimal. Perfusion maps are symmetric without evidence of cerebral ischemia or core infarction.     Normal brain perfusion CT. Signer Name: Christiano Chamorro MD  Signed: 3/10/2021 4:52 AM  Workstation Name: [facility]  [facility]              Results for orders placed during the hospital encounter of 03/10/21    Adult Transthoracic Echo Complete W/ Cont if Necessary Per Protocol (With Agitated Saline)    Interpretation Summary  · Left ventricular ejection fraction appears to be 56 - 60%. Left ventricular systolic function is normal.  · Left ventricular wall thickness is consistent with mild concentric hypertrophy.  · Left ventricular diastolic function was normal.  · The right ventricular cavity is mildly dilated.  · Saline test results are negative.      Plan for Follow-up of Pending Labs/Results: No pending labs at the time of discharge    Discharge Details        Discharge Medications      Continue These Medications      Instructions Start Date   buPROPion 100 MG tablet  Commonly known as: WELLBUTRIN   100 mg, Oral, 2 Times Daily      diclofenac 75 MG EC tablet  Commonly known as: VOLTAREN   75 mg, Oral, 2 Times Daily PRN      sertraline 100 MG tablet  Commonly known as: ZOLOFT   100 mg, Oral, Daily      TiZANidine 4 MG capsule  Commonly known as: ZANAFLEX   4 mg, Oral, 2 Times Daily PRN             No Known Allergies      Discharge Disposition:  Home or Self Care    Diet:  Hospital:  Diet Order   Procedures   • Diet Regular; Cardiac       Activity:  Activity Instructions     Activity as Tolerated            Restrictions or Other Recommendations:  Due to the Covid pandemic please wear a mask when you are out, practice physical distancing and wash your hands often.       CODE STATUS:    Code Status and Medical Interventions:   Ordered at: 03/10/21 0441     Code Status:    CPR     Medical Interventions (Level of Support Prior to Arrest):    Full       No  future appointments.    Additional Instructions for the Follow-ups that You Need to Schedule     Ambulatory Referral to Occupational Therapy   As directed      Specialty needed: Neuro         Ambulatory Referral to Physical Therapy Neuro   As directed      Specialty needed: Neuro         Discharge Follow-up with PCP   As directed       Currently Documented PCP:    Forrest Lo PA    PCP Phone Number:    [phone]     Follow Up Details: next week to discuss numbness left side                     Gemma Hutchins MD  03/10/21      Time Spent on Discharge:  I spent  35  minutes on this discharge activity which included: face-to-face encounter with the patient, reviewing the data in the system, coordination of the care with the nursing staff as well as consultants, documentation, and entering orders.

## 2021-03-10 NOTE — PROGRESS NOTES
Discharge Planning Assessment  [facility]     Patient Name: Luis Darby  MRN: [id number]  Today's Date: 3/10/2021    Admit Date: 3/10/2021    Discharge Needs Assessment     Row Name 03/10/21 1302       Living Environment    Lives With  parent(s);spouse;child(tessa), dependent    Name(s) of Who Lives With Patient  wife is Emma, mom Mera and  his small children    Current Living Arrangements  home/apartment/condo    Primary Care Provided by  self    Provides Primary Care For  child(tessa)    Family Caregiver if Needed  spouse    Quality of Family Relationships  unable to assess    Able to Return to Prior Arrangements  yes       Resource/Environmental Concerns    Resource/Environmental Concerns  none    Transportation Concerns  car, none       Transition Planning    Patient/Family Anticipates Transition to  home with family    Patient/Family Anticipated Services at Transition  rehabilitation services    Transportation Anticipated  family or friend will provide       Discharge Needs Assessment    Readmission Within the Last 30 Days  no previous admission in last 30 days    Equipment Currently Used at Home  none    Concerns to be Addressed  discharge planning    Anticipated Changes Related to Illness  none    Equipment Needed After Discharge  none    Outpatient/Agency/Support Group Needs  outpatient therapy        Discharge Plan     Row Name 03/10/21 1308       Plan    Plan  HOme with family    Plan Comments  I spoke with patient in regards to discharge planning. He reports being independent, no medical equipment prior to admission. Would like outpatient rehab close to home. Referral given to [facility] outpatient rehab. They will arrange appointment    Final Discharge Disposition Code  01 - home or self-care        Continued Care and Services - Admitted Since 3/10/2021    Coordination has not been started for this encounter.         Demographic Summary     Row Name 03/10/21 1764       General  Information    Referral Source  admission list    Preferred Language  English    General Information Comments  PCP is Forrest Lo       Contact Information    Permission Granted to Share Info With          Functional Status     Row Name 03/10/21 1481       Functional Status    Usual Activity Tolerance  excellent       Functional Status, IADL    Medications  independent    Meal Preparation  independent    Housekeeping  independent    Laundry  independent    Shopping  independent       Employment/    Employment/ Comments  Patient has [employer] insurance and denies concerns regarding coverage or disruption in coverage issues. Patient has drug coverage and denies issues obtaining or affording current medications. .        Psychosocial    No documentation.       Abuse/Neglect    No documentation.       Legal    No documentation.       Substance Abuse    No documentation.       Patient Forms    No documentation.           Maria Fernanda Jimenez RN

## 2021-03-10 NOTE — DISCHARGE PLACEMENT REQUEST
"Rachel Darby (25 y.o. Male)     Please contact patient at [phone]    Date of Birth Social Security Number Address Home Phone MRN    1995  [address] [place] [address] [phone] [id number]    Nondenominational Marital Status          Hinduism        Admission Date Admission Type Admitting Provider Attending Provider Department, Room/Bed    3/10/21 Elective Gemma Hutchins MD Stephen, Karla R, MD [facility] 3F, S303/1    Discharge Date Discharge Disposition Discharge Destination                       Attending Provider: Gemma Hutchins MD    Allergies: No Known Allergies    Isolation: None   Infection: None   Code Status: CPR    Ht: 185.4 cm (73\")   Wt: 140 kg (307 lb 12.8 oz)    Admission Cmt: None   Principal Problem: Left sided numbness [R20.0]                 Active Insurance as of 3/10/2021     Primary Coverage     Payor Plan Insurance Group Employer/Plan Group    [place] [id number]     Payor Plan Address Payor Plan Phone Number Payor Plan Fax Number Effective Dates    [employer] [address] [address] [phone]  2021 - None Entered    [place] [address]       Subscriber Name Subscriber Birth Date Member ID       RACHEL DARBY 1995 [id number]                 Emergency Contacts      (Rel.) Home Phone Work Phone Mobile Phone    EROS DARBY (Mother) [phone] -- --    janet darby (Spouse) [phone] -- [phone]            Insurance Information                [place] Phone: [phone]    Subscriber: Rachel Darby Subscriber#: [id number]    Group#: [id number] Precert#:         [address]  [address] [facility] [address]  Phone:  [phone]  Fax:  [phone] Date: Mar 10, 2021      Ambulatory Referral to Occupational Therapy     Patient:  Rachel Darby MRN:  [id number]   [address] [place] [address] :  1995  SSN:    Phone: " [phone] Sex:  M      INSURANCE PAYOR PLAN GROUP # SUBSCRIBER ID   Primary:    [place] [id number]      Referring Provider Information:  ELVER HUTCHINS Phone: [phone] Fax: [phone]      Referral Information:   # Visits:  1 Referral Type: Physical Therapy [AE1]   Urgency:  Routine Referral Reason: Specialty Services Required   Start Date: Mar 10, 2021 End Date:  To be determined by Insurer   Diagnosis: Left sided numbness (R20.0 [ICD-10-CM] 782.0 [ICD-9-CM])      Refer to Dept:   Refer to Provider:   Refer to Facility:       Specialty needed: Neuro     This document serves as a request of services and does not constitute Insurance authorization or approval of services.  To determine eligibility, please contact the members Insurance carrier to verify and review coverage.     If you have medical questions regarding this request for services. Please contact [address] at [phone] during normal business hours.       Authorizing Provider:Elver Hutchins MD  Authorizing Provider's NPI: [id number]  Order Entered By: Maria Fernanda Jimenez RN 3/10/2021 12:41 PM     Electronically signed by: Elver Hutchins MD 3/10/2021 12:41 PM            [address] [address]  Phone:  [phone]  Fax:  [phone] Date: Mar 10, 2021      Ambulatory Referral to Physical Therapy Neuro     Patient:  Luis Darby MRN:  [id number]   [address] [facility] [address] :  1995  SSN:    Phone: [phone] Sex:  M      INSURANCE PAYOR PLAN GROUP # SUBSCRIBER ID   Primary:    [place] [id number]      Referring Provider Information:  ELVER HUTCHINS Phone: [phone] Fax: [phone]      Referral Information:   # Visits:  1 Referral Type: Physical Therapy [AE1]   Urgency:  Routine Referral Reason: Specialty Services Required   Start Date: Mar 10, 2021 End Date:  To be determined by  "Insurer   Diagnosis: Left sided numbness (R20.0 [ICD-10-CM] 782.0 [ICD-9-CM])      Refer to Dept:   Refer to Provider:   Refer to Facility:       Specialty needed: Neuro     This document serves as a request of services and does not constitute Insurance authorization or approval of services.  To determine eligibility, please contact the members Insurance carrier to verify and review coverage.     If you have medical questions regarding this request for services. Please contact [address] at [phone] during normal business hours.       Authorizing Provider:Gemma Hutchins MD  Authorizing Provider's NPI: [id number]  Order Entered By: Maria Fernanda Jimenez RN 3/10/2021 12:41 PM     Electronically signed by: Gemma Hutchins MD 3/10/2021 12:41 PM               History & Physical      Day, Reba REY MD at 03/10/21 0405              [facility] Medicine Services  HISTORY AND PHYSICAL    Patient Name: Luis Darby  : 1995  MRN: [id number]  Primary Care Physician: Forrets Lo PA  Date of admission: 3/10/2021    Subjective   Subjective     Chief Complaint:  Transfer for stroke  Left sided numbness/weakness    HPI:  Luis Darby is a 25 y.o. male with a history of psychiatric issues, tobacco abuse (2ppd smoker) presents as a transfer from [facility] ED for possible stroke, with left sided weakness and numbness. 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  He was able to make it to the ER. He denies trouble speaking or confusion, no facial droop.  He did have left sided facial numbness, as well as decreased sensation in his left arm and leg.  Pt states he did feel he \"zoned out\" at work today but otherwise felt like his usual self.    CT head was negative at the outside ED.  Upon arrival, patient was seen by the stroke navigator APRN and stat labs and imaging have been ordered and pending.  NIH score 1.    Denies CP, SOB, " "cough, fever, chills, N/V/D. He did have medication adjustment recently and started on lexapro.    Hospital medicine will admit for further evaluation and treatment.     Attending HPI:  24 y/o male with hx of tobacco abuse and morbid obesity as well as intermittent explosive disorder here w/ c/o onset of left sided numbness/weakness while driving around 10:45 pm.  Pt also noted feeling \"mentally off\" and states his co-workers noticed the same.  No facial droop but did have left sided facial numbness.  No prior hx of stroke.   Pt does note change in meds w/ the addition of lexapro last week.     NIH at OSH 1.    Current COVID Risks are:  [] Fever []  Cough [] Shortness of breath [] Fatigue [] Change in taste or smell    [] Exposure to COVID positive patient  [] High risk facility   [x]  NONE    Review of Systems   HENT: Positive for dental problem.    Eyes: Negative.    Respiratory: Negative.    Cardiovascular: Negative.    Endocrine: Negative.    Genitourinary: Negative.    Musculoskeletal: Negative.    Skin: Negative.    Allergic/Immunologic: Negative.    Neurological: Positive for weakness and numbness.   Hematological: Negative.    Psychiatric/Behavioral: Negative.         All other systems reviewed and are negative.     Personal History     Past Medical History:   Diagnosis Date   • Anxiety    • Depression    • Intermittent explosive disorder in adult        No past surgical history on file.    Family History: family history is not on file. Otherwise pertinent FHx was reviewed and unremarkable.     Social History:  reports that he has been smoking. He has been smoking about 2.00 packs per day. He does not have any smokeless tobacco history on file. He reports current alcohol use. He reports that he does not use drugs.  Social History     Social History Narrative   • Not on file       Medications:  TiZANidine, buPROPion, diclofenac, and sertraline    No Known Allergies    Objective   Objective     Vital Signs:   "   Temp:  [97.5 °F (36.4 °C)] 97.5 °F (36.4 °C)  Heart Rate:  [69] 69  Resp:  [20] 20  BP: (145)/(77) 145/77    Physical Exam   Constitutional: Awake, alert, dissheveled male  Eyes: PERRLA, sclerae anicteric, no conjunctival injection  HENT: NCAT, mucous membranes moist  - poor dentition with necrotic odor  Neck: Supple, no thyromegaly, no lymphadenopathy, trachea midline  Respiratory: Clear to auscultation bilaterally, nonlabored respirations   Cardiovascular: RRR, no murmurs, rubs, or gallops, palpable pedal pulses bilaterally  Gastrointestinal: Positive bowel sounds, soft, nontender, nondistended  Musculoskeletal: No bilateral ankle edema, no clubbing or cyanosis to extremities  Psychiatric: Appropriate affect, cooperative  Neurologic: Oriented x 3, strength diminished left arm, leg  Decreased sensation left face, arm, leg  Skin: No rashes    Separate exam performed by me w/ no changes to the above.      Results Reviewed:  I have personally reviewed most recent indicated data and agree with findings including:  []  Laboratory  []  Radiology  []  EKG/Telemetry  []  Pathology  []  Cardiac/Vascular Studies  [x]  Old records  []  Other:      Most pertinent findings include:  Pending however OSH cbc, cmp unremarkable other than K 3.4.  HCT negative at OSH.    LAB RESULTS:                              Brief Urine Lab Results     None        Microbiology Results (last 10 days)     ** No results found for the last 240 hours. **          CT Cerebral Perfusion With & Without Contrast    Result Date: 3/10/2021  CT CEREBRAL PERFUSION W WO CONTRAST HISTORY: Left-sided weakness since last night. TECHNIQUE: Axial CT images of the brain without and with intravenous contrast using cerebral perfusion protocol. Post-processing parametric maps were created using RAPID software and reviewed. Radiation dose reduction techniques included automated exposure control or exposure modulation based on body size. CT and nuclear cardiology  exams in the last 12 months: 0. COMPARISON: None. FINDINGS: Arterial input function is optimal. Perfusion maps are symmetric without evidence of cerebral ischemia or core infarction.     Impression: Normal brain perfusion CT. Signer Name: Christiano Chamorro MD  Signed: 3/10/2021 4:52 AM  Workstation Name: [facility]  [facility]          Assessment/Plan   Assessment & Plan       Left sided numbness      1. Left sided numbness/weakness;  **pt w/ tobacco abuse, obesity and also very poor dentitia  - rule out CVA  - CT head neg at OSH  - stat CT perfusion, CTA head, neck, MRI brain wo contrast  - neurology consult  - neuro checks  - asa 324 mg  - stat ECHO; may need WOLF as well to r/o vegetation w/ extremely poor dentitia  - PT/OT eval and treat    2. Psychiatric issues  - cont lexapro, trileptal ---> need to update med list    3. Poor dentitia;  -pt needs extraction; may be contributing to above  -will place on augmentin for now    DVT prophylaxis:  SCDs      CODE STATUS:  full  Code Status and Medical Interventions:   Ordered at: 03/10/21 0441     Code Status:    CPR     Medical Interventions (Level of Support Prior to Arrest):    Full         This note has been completed as part of a split-shared workflow.     Signature: Electronically signed by VITALIY Barboza, 03/10/21, 4:05 AM EST      Electronically signed by Reba Seymour MD, 03/10/21, 5:02 AM EST.    OBSERVATION status, however if further evaluation or treatment plans warrant, status may change.  Based upon current information, I predict patient's care encounter to be less than or equal to 2 midnights.          Electronically signed by Reba Seymour MD at 03/10/21 0503          Physical Therapy Notes (most recent note)      Shanna Molina, PT Student at 03/10/21 0907  Version 3 of 3    Attestation signed by Toña Blanco PT at 03/10/21 1024    Toña Blanco PT                    Patient Name: Luis Darby  : 1995    MRN:  [id number]                              Today's Date: 3/10/2021       Admit Date: 3/10/2021    Visit Dx: No diagnosis found.  Patient Active Problem List   Diagnosis   • Left sided numbness     Past Medical History:   Diagnosis Date   • Anxiety    • Depression    • Intermittent explosive disorder in adult      No past surgical history on file.  General Information     Row Name 03/10/21 0907          Physical Therapy Time and Intention    Document Type  discharge evaluation/summary  (Pended)   -RN     Mode of Treatment  individual therapy;physical therapy  (Pended)   -RN     Row Name 03/10/21 0907          General Information    Patient Profile Reviewed  yes  (Pended)   -RN     Prior Level of Function  independent:;all household mobility;gait;community mobility;ADL's;driving;work  (Pended)  pt states he is a   -RN     Existing Precautions/Restrictions  other (see comments)  (Pended)  decreased LLE senstation and LLE weakness  -RN     Barriers to Rehab  --  (Pended)  hx of psychiatric impairments  -RN     Row Name 03/10/21 0907          Living Environment    Lives With  child(tessa), dependent;spouse;parent(s)  (Pended)  Lives with mother  -RN     Row Name 03/10/21 0907          Home Main Entrance    Number of Stairs, Main Entrance  three  (Pended)   -RN     Stair Railings, Main Entrance  railing on left side (ascending)  (Pended)   -RN     Row Name 03/10/21 0907          Stairs Within Home, Primary    Number of Stairs, Within Home, Primary  none  (Pended)   -RN     Row Name 03/10/21 0907          Cognition    Orientation Status (Cognition)  oriented x 4  (Pended)   -RN     Row Name 03/10/21 0907          Safety Issues, Functional Mobility    Safety Issues Affecting Function (Mobility)  safety precaution awareness;safety precautions follow-through/compliance  (Pended)   -RN     Impairments Affecting Function (Mobility)  balance;strength;sensation/sensory awareness;coordination  (Pended)   -RN     Comment, Safety  Issues/Impairments (Mobility)  Pt is slightly bradykinetic on LLE and requires more effort to perform LAQ repetitions x10 with slight dyscoordination. Pt demonstrates decreased L ankle strategy duirng standing marches but is able to walk with no external support and no LOB noted.  (Pended)   -RN       User Key  (r) = Recorded By, (t) = Taken By, (c) = Cosigned By    Initials Name Provider Type    RN Shanna Molina, PT Student PT Student        Mobility     Row Name 03/10/21 0907          Bed Mobility    Bed Mobility  sit-supine;scooting/bridging  (Pended)   -RN     Scooting/Bridging [place] (Bed Mobility)  supervision  (Pended)   -RN     Supine-Sit [place] (Bed Mobility)  supervision  (Pended)   -RN     Assistive Device (Bed Mobility)  bed rails  (Pended)   -RN     Comment (Bed Mobility)  Pt sitting in chair at beginning of PT session. Placed in bed at end of session. Pt states he is confortable in R S/L position.  (Pended)   -RN     Row Name 03/10/21 0907          Sit-Stand Transfer    Sit-Stand [place] (Transfers)  standby assist;other (see comments)  (Pended)  SBA for eval  -RN     Assistive Device (Sit-Stand Transfers)  other (see comments)  (Pended)  no AD  -RN     Row Name 03/10/21 0907          Gait/Stairs (Locomotion)    [place] Level (Gait)  standby assist;1 person assist  (Pended)   -RN     Assistive Device (Gait)  other (see comments)  (Pended)  no AD  -RN     Distance in Feet (Gait)  278'  (Pended)   -RN     Deviations/Abnormal Patterns (Gait)  base of support, wide;gait speed decreased;emily decreased  (Pended)   -RN     Left Sided Gait Deviations  heel strike decreased  (Pended)   -RN     [place] Level (Stairs)  stand by assist;1 person assist;other (see comments)  (Pended)  SBA for eval  -RN     Assistive Device (Stairs)  other (see comments)  (Pended)  no AD  -RN     Handrail Location (Stairs)  left side (ascending);right side (descending)  (Pended)   -RN     Ascending  Technique (Stairs)  step-over-step  (Pended)   -RN     Descending Technique (Stairs)  step-over-step  (Pended)   -RN     Comment (Gait/Stairs)  Pt is slightly ataxic on LLE with gait especially with forward tandem walking but pt notes previous balance difficulties before L sided numbness and weakness. Pt had no LOB and multitasking mild difficulty with multitasking during gait.  (Pended)   -RN     Row Name 03/10/21 0907          Mobility    Extremity Weight-bearing Status  other (see comments)  (Pended)  WBAT  -RN       User Key  (r) = Recorded By, (t) = Taken By, (c) = Cosigned By    Initials Name Provider Type    RN Shanna Molina, PT Student PT Student        Obj/Interventions     Row Name 03/10/21 0907          Range of Motion Comprehensive    General Range of Motion  bilateral lower extremity ROM WFL  (Pended)   -RN     Row Name 03/10/21 0907          Strength Comprehensive (MMT)    General Manual Muscle Testing (MMT) Assessment  lower extremity strength deficits identified  (Pended)   -RN     Comment, General Manual Muscle Testing (MMT) Assessment  RLE grossly 5/5, LLE grossly 4/5  (Pended)   -RN     Row Name 03/10/21 0907          Motor Skills    Motor Skills  coordination  (Pended)   -RN     Coordination  gross motor deficit;dysdiadochokinesia;left  (Pended)   -RN     Therapeutic Exercise  hip;knee  (Pended)   -RN     Row Name 03/10/21 0907          Hip (Therapeutic Exercise)    Hip (Therapeutic Exercise)  AROM (active range of motion)  (Pended)   -RN     Hip AROM (Therapeutic Exercise)  bilateral;standing;flexion;5 repetitions  (Pended)   -RN     Row Name 03/10/21 0907          Knee (Therapeutic Exercise)    Knee (Therapeutic Exercise)  AROM (active range of motion)  (Pended)   -RN     Knee AROM (Therapeutic Exercise)  bilateral;sitting;10 repetitions  (Pended)   -RN     Row Name 03/10/21 0907          Balance    Balance Assessment  sitting static balance;sitting dynamic balance;standing static  balance;standing dynamic balance  (Pended)   -RN     Static Sitting Balance  WFL;supported;sitting in chair  (Pended)   -RN     Dynamic Sitting Balance  WFL;supported;sitting in chair  (Pended)   -RN     Static Standing Balance  WFL;unsupported;standing  (Pended)   -RN     Dynamic Standing Balance  WFL;unsupported;standing  (Pended)   -RN     Balance Interventions  standing;dynamic;minimal challenge;other (see comments)  (Pended)  Pt was able to change gait speed, 180 degree turn, perform tandem walking (5'), and B side stepping (5' each direction, 1x).  -RN     Comment, Balance  Pt shows decreased L ankle strategy with standing marches. Pt slightly ataxic on LLE during tandem walking.  (Pended)   -RN     Row Name 03/10/21 0907          Sensory Assessment (Somatosensory)    Sensory Assessment (Somatosensory)  other (see comments)  (Pended)  L LE sensation impaired, numbness L leg and L foot.  -RN       User Key  (r) = Recorded By, (t) = Taken By, (c) = Cosigned By    Initials Name Provider Type    RN Shanna Molina, PT Student PT Student        Goals/Plan     Row Name 03/10/21 0907          Gait Training Goal 1 (PT)    Activity/Assistive Device (Gait Training Goal 1, PT)  gait (walking locomotion);improve balance and speed;increase endurance/gait distance  (Pended)   -RN     [place] Level (Gait Training Goal 1, PT)  independent  (Pended)   -RN     Distance (Gait Training Goal 1, PT)  250'  (Pended)   -RN     Time Frame (Gait Training Goal 1, PT)  long term goal (LTG);2 weeks  (Pended)   -RN     Progress/Outcome (Gait Training Goal 1, PT)  goal met  (Pended)   -RN     Row Name 03/10/21 0907          Stairs Goal 1 (PT)    Activity/Assistive Device (Stairs Goal 1, PT)  ascending stairs;descending stairs;using handrail, left  (Pended)   -RN     [place] Level/Cues Needed (Stairs Goal 1, PT)  modified independence  (Pended)   -RN     Number of Stairs (Stairs Goal 1, PT)  3  (Pended)   -RN     Time Frame (Stairs  Goal 1, PT)  long term goal (LTG);2 weeks  (Pended)   -RN     Progress/Outcome (Stairs Goal 1, PT)  goal met  (Pended)   -RN       User Key  (r) = Recorded By, (t) = Taken By, (c) = Cosigned By    Initials Name Provider Type    RN Shanna oMlina, PT Student PT Student        Clinical Impression     Row Name 03/10/21 0907          Pain    Additional Documentation  Pain Scale: Numbers Pre/Post-Treatment (Group)  (Pended)   -RN     Row Name 03/10/21 0907          Pain Scale: Numbers Pre/Post-Treatment    Pretreatment Pain Rating  2/10  (Pended)   -RN     Posttreatment Pain Rating  0/10 - no pain  (Pended)   -RN     Pain Location - Side  Left  (Pended)  L IV insertional site  -RN     Pre/Posttreatment Pain Comment  NSG removed IV in L antecunbital space  (Pended)   -RN     Pain Intervention(s)  Ambulation/increased activity;Repositioned  (Pended)   -RN     Row Name 03/10/21 0907          Plan of Care Review    Plan of Care Reviewed With  patient  (Pended)   -RN     Outcome Summary  Pt was modified independent with bed mobility, SBA for STS, gait, and stairs. Pt ambulated 278' with no AD. Pt ascended and descended 3 steps using L rail with no unsteadiness noted. Pt demonstrates decreased L ankle strategy during dynamic balance activites and slightly antalgic on LLE. Pt was lethargic and slightly fatigued at end of PT session. Pt is safe to be up ad ROSE MARIE will d/c inpatient PT at this time. D/C recommendation to home with OP PT for balance, coordinaiton, and strengthing to return to work.  (Pended)   -RN     Row Name 03/10/21 0907          Therapy Assessment/Plan (PT)    Rehab Potential (PT)  good, to achieve stated therapy goals  (Pended)   -RN     Criteria for Skilled Interventions Met (PT)  yes  (Pended)   -RN     Row Name 03/10/21 0907          Vital Signs    Post Systolic BP Rehab  117  (Pended)   -RN     Post Treatment Diastolic BP  65  (Pended)   -RN     Pretreatment Heart Rate (beats/min)  74  (Pended)   -RN      Posttreatment Heart Rate (beats/min)  71  (Pended)   -RN     Pre SpO2 (%)  97  (Pended)   -RN     O2 Delivery Pre Treatment  room air  (Pended)   -RN     Post SpO2 (%)  95  (Pended)   -RN     O2 Delivery Post Treatment  room air  (Pended)   -RN     Pre Patient Position  Sitting  (Pended)   -RN     Intra Patient Position  Standing  (Pended)   -RN     Post Patient Position  Supine  (Pended)   -RN     Row Name 03/10/21 0907          Positioning and Restraints    Pre-Treatment Position  sitting in chair/recliner  (Pended)   -RN     Post Treatment Position  bed  (Pended)   -RN     In Bed  side lying right;call light within reach;encouraged to call for assist;notified nsg  (Pended)   -RN       User Key  (r) = Recorded By, (t) = Taken By, (c) = Cosigned By    Initials Name Provider Type    RN Shanna Molina, PT Student PT Student        Outcome Measures     Row Name 03/10/21 0907          How much help from another person do you currently need...    Turning from your back to your side while in flat bed without using bedrails?  4  (Pended)   -RN     Moving from lying on back to sitting on the side of a flat bed without bedrails?  4  (Pended)   -RN     Moving to and from a bed to a chair (including a wheelchair)?  4  (Pended)   -RN     Standing up from a chair using your arms (e.g., wheelchair, bedside chair)?  4  (Pended)   -RN     Climbing 3-5 steps with a railing?  4  (Pended)   -RN     To walk in hospital room?  4  (Pended)   -RN     AM-PAC 6 Clicks Score (PT)  24  (Pended)   -RN     Row Name 03/10/21 0907          Modified Mercer Scale    Modified Mercer Scale  1 - No significant disability despite symptoms.  Able to carry out all usual duties and activities.  (Pended)   -RN     Row Name 03/10/21 0907          Functional Assessment    Outcome Measure Options  AM-PAC 6 Clicks Basic Mobility (PT);Modified [place]  (Pended)   -RN       User Key  (r) = Recorded By, (t) = Taken By, (c) = Cosigned By    Initials Name Provider  Type    RN Shanna Molina, PT Student PT Student        Physical Therapy Education                 Title: PT OT SLP Therapies (Done)     Topic: Physical Therapy (Done)     Point: Mobility training (Done)     Learning Progress Summary           Patient Acceptance, E,TB, VU,NR by RN at 3/10/2021 0907    Comment: Pt instructed on PT POC, PT scope, and D/C recommendation to OP PT.                   Point: Body mechanics (Done)     Learning Progress Summary           Patient Acceptance, E,TB, VU,NR by RN at 3/10/2021 0907    Comment: Pt instructed on PT POC, PT scope, and D/C recommendation to OP PT.                   Point: Precautions (Done)     Learning Progress Summary           Patient Acceptance, E,TB, VU,NR by RN at 3/10/2021 0907    Comment: Pt instructed on PT POC, PT scope, and D/C recommendation to OP PT.                               User Key     Initials Effective Dates Name Provider Type Discipline    RN 07/21/20 -  Shanna Molina, PT Student PT Student PT              PT Recommendation and Plan  Planned Therapy Interventions (PT): (P) balance training, bed mobility training, gait training, home exercise program, motor coordination training, stair training, strengthening, stretching, ROM (range of motion), postural re-education, patient/family education, neuromuscular re-education, transfer training  Plan of Care Reviewed With: (P) patient  Outcome Summary: (P) Pt was modified independent with bed mobility, SBA for STS, gait, and stairs. Pt ambulated 278' with no AD. Pt ascended and descended 3 steps using L rail with no unsteadiness noted. Pt demonstrates decreased L ankle strategy during dynamic balance activites and slightly antalgic on LLE. Pt was lethargic and slightly fatigued at end of PT session. Pt is safe to be up ad [facility] will d/c inpatient PT at this time. D/C recommendation to home with OP PT for balance, coordinaiton, and strengthing to return to work.     Time Calculation:   PT Charges     Row  Name 03/10/21 0907             Time Calculation    Start Time  907  (Pended)   -RN      PT Received On  03/10/21  (Pended)   -RN         Timed Charges    27113 - PT Therapeutic Activity Minutes  --  (Pended)   -RN        User Key  (r) = Recorded By, (t) = Taken By, (c) = Cosigned By    Initials Name Provider Type    RN Shanna Molina, PT Student PT Student        Therapy Charges for Today     Code Description Service Date Service Provider Modifiers Qty    75114535894 HC PT EVAL LOW COMPLEXITY 4 3/10/2021 Shanna Molina, PT Student GP 1          PT G-Codes  Outcome Measure Options: AM-PAC 6 Clicks Daily Activity (OT)  AM-PAC 6 Clicks Score (PT): (P) 24  AM-PAC 6 Clicks Score (OT): 21  Modified [place] Scale: (P) 1 - No significant disability despite symptoms.  Able to carry out all usual duties and activities.    Shanna Molina, PT Student  3/10/2021      Electronically signed by Toña Blanco, PT at 03/10/21 1024     Shanna Molina, PT Student at 03/10/21 0907  Version 2 of 3         Patient Name: Luis Darby  : 1995    MRN: [id number]                              Today's Date: 3/10/2021       Admit Date: 3/10/2021    Visit Dx: No diagnosis found.  Patient Active Problem List   Diagnosis   • Left sided numbness     Past Medical History:   Diagnosis Date   • Anxiety    • Depression    • Intermittent explosive disorder in adult      No past surgical history on file.  General Information     Row Name 03/10/21 0907          Physical Therapy Time and Intention    Document Type  discharge evaluation/summary  (Pended)   -RN     Mode of Treatment  individual therapy;physical therapy  (Pended)   -RN     Row Name 03/10/21 0907          General Information    Patient Profile Reviewed  yes  (Pended)   -RN     Prior Level of Function  independent:;all household mobility;gait;community mobility;ADL's;driving;work  (Pended)  pt states he is a   -RN     Existing Precautions/Restrictions  other (see comments)   (Pended)  decreased LLE senstation and LLE weakness  -RN     Barriers to Rehab  --  (Pended)  hx of psychiatric impairments  -RN     Row Name 03/10/21 0907          Living Environment    Lives With  child(tessa), dependent;spouse;parent(s)  (Pended)  Lives with mother  -RN     Row Name 03/10/21 0907          Home Main Entrance    Number of Stairs, Main Entrance  three  (Pended)   -RN     Stair Railings, Main Entrance  railing on left side (ascending)  (Pended)   -RN     Row Name 03/10/21 0907          Stairs Within Home, Primary    Number of Stairs, Within Home, Primary  none  (Pended)   -RN     Row Name 03/10/21 0907          Cognition    Orientation Status (Cognition)  oriented x 4  (Pended)   -RN     Row Name 03/10/21 0907          Safety Issues, Functional Mobility    Safety Issues Affecting Function (Mobility)  safety precaution awareness;safety precautions follow-through/compliance  (Pended)   -RN     Impairments Affecting Function (Mobility)  balance;strength;sensation/sensory awareness;coordination  (Pended)   -RN     Comment, Safety Issues/Impairments (Mobility)  Pt is slightly bradykinetic on LLE and requires more effort to perform LAQ repetitions x10 with slight dyscoordination. Pt demonstrates decreased L ankle strategy duirng standing marches but is able to walk with no external support and no LOB noted.  (Pended)   -RN       User Key  (r) = Recorded By, (t) = Taken By, (c) = Cosigned By    Initials Name Provider Type    RN Shanna Molina, PT Student PT Student        Mobility     Row Name 03/10/21 0907          Bed Mobility    Bed Mobility  sit-supine;scooting/bridging  (Pended)   -RN     Scooting/Bridging [place] (Bed Mobility)  supervision  (Pended)   -RN     Supine-Sit [place] (Bed Mobility)  supervision  (Pended)   -RN     Assistive Device (Bed Mobility)  bed rails  (Pended)   -RN     Comment (Bed Mobility)  Pt sitting in chair at beginning of PT session. Placed in bed at end of session. Pt  states he is confortable in R S/L position.  (Pended)   -RN     Row Name 03/10/21 0907          Sit-Stand Transfer    Sit-Stand [place] (Transfers)  standby assist;other (see comments)  (Pended)  SBA for eval  -RN     Assistive Device (Sit-Stand Transfers)  other (see comments)  (Pended)  no AD  -RN     Row Name 03/10/21 0907          Gait/Stairs (Locomotion)    [place] Level (Gait)  standby assist;1 person assist  (Pended)   -RN     Assistive Device (Gait)  other (see comments)  (Pended)  no AD  -RN     Distance in Feet (Gait)  278'  (Pended)   -RN     Deviations/Abnormal Patterns (Gait)  base of support, wide;gait speed decreased;emily decreased  (Pended)   -RN     Left Sided Gait Deviations  heel strike decreased  (Pended)   -RN     [place] Level (Stairs)  stand by assist;1 person assist;other (see comments)  (Pended)  SBA for eval  -RN     Assistive Device (Stairs)  other (see comments)  (Pended)  no AD  -RN     Handrail Location (Stairs)  left side (ascending);right side (descending)  (Pended)   -RN     Ascending Technique (Stairs)  step-over-step  (Pended)   -RN     Descending Technique (Stairs)  step-over-step  (Pended)   -RN     Comment (Gait/Stairs)  Pt is slightly ataxic on LLE with gait especially with forward tandem walking but pt notes previous balance difficulties before L sided numbness and weakness. Pt had no LOB and multitasking mild difficulty with multitasking during gait.  (Pended)   -RN     Row Name 03/10/21 0907          Mobility    Extremity Weight-bearing Status  other (see comments)  (Pended)  WBAT  -RN       User Key  (r) = Recorded By, (t) = Taken By, (c) = Cosigned By    Initials Name Provider Type    RN Shanna Molina, PT Student PT Student        Obj/Interventions     Row Name 03/10/21 0907          Range of Motion Comprehensive    General Range of Motion  bilateral lower extremity ROM WFL  (Pended)   -RN     Row Name 03/10/21 0907          Strength Comprehensive (MMT)     General Manual Muscle Testing (MMT) Assessment  lower extremity strength deficits identified  (Pended)   -RN     Comment, General Manual Muscle Testing (MMT) Assessment  RLE grossly 5/5, LLE grossly 4/5  (Pended)   -RN     Row Name 03/10/21 0907          Motor Skills    Motor Skills  coordination  (Pended)   -RN     Coordination  gross motor deficit;dysdiadochokinesia;left  (Pended)   -RN     Therapeutic Exercise  hip;knee  (Pended)   -RN     Row Name 03/10/21 0907          Hip (Therapeutic Exercise)    Hip (Therapeutic Exercise)  AROM (active range of motion)  (Pended)   -RN     Hip AROM (Therapeutic Exercise)  bilateral;standing;flexion;5 repetitions  (Pended)   -RN     Row Name 03/10/21 0907          Knee (Therapeutic Exercise)    Knee (Therapeutic Exercise)  AROM (active range of motion)  (Pended)   -RN     Knee AROM (Therapeutic Exercise)  bilateral;sitting;10 repetitions  (Pended)   -RN     Row Name 03/10/21 0907          Balance    Balance Assessment  sitting static balance;sitting dynamic balance;standing static balance;standing dynamic balance  (Pended)   -RN     Static Sitting Balance  WFL;supported;sitting in chair  (Pended)   -RN     Dynamic Sitting Balance  WFL;supported;sitting in chair  (Pended)   -RN     Static Standing Balance  WFL;unsupported;standing  (Pended)   -RN     Dynamic Standing Balance  WFL;unsupported;standing  (Pended)   -RN     Balance Interventions  standing;dynamic;minimal challenge;other (see comments)  (Pended)  Pt was able to change gait speed, 180 degree turn, perform tandem walking (5'), and B side stepping (5' each direction, 1x).  -RN     Comment, Balance  Pt shows decreased L ankle strategy with standing marches. Pt slightly ataxic on LLE during tandem walking.  (Pended)   -RN     Row Name 03/10/21 0907          Sensory Assessment (Somatosensory)    Sensory Assessment (Somatosensory)  other (see comments)  (Pended)  L LE sensation impaired, numbness L leg and L foot.  -RN        User Key  (r) = Recorded By, (t) = Taken By, (c) = Cosigned By    Initials Name Provider Type    Shanna Aden, PT Student PT Student        Goals/Plan     Row Name 03/10/21 0907          Gait Training Goal 1 (PT)    Activity/Assistive Device (Gait Training Goal 1, PT)  gait (walking locomotion);improve balance and speed;increase endurance/gait distance  (Pended)   -RN     [place] Level (Gait Training Goal 1, PT)  independent  (Pended)   -RN     Distance (Gait Training Goal 1, PT)  250'  (Pended)   -RN     Time Frame (Gait Training Goal 1, PT)  long term goal (LTG);2 weeks  (Pended)   -RN     Progress/Outcome (Gait Training Goal 1, PT)  goal met  (Pended)   -RN     Row Name 03/10/21 0907          Stairs Goal 1 (PT)    Activity/Assistive Device (Stairs Goal 1, PT)  ascending stairs;descending stairs;using handrail, left  (Pended)   -RN     [place] Level/Cues Needed (Stairs Goal 1, PT)  modified independence  (Pended)   -RN     Number of Stairs (Stairs Goal 1, PT)  3  (Pended)   -RN     Time Frame (Stairs Goal 1, PT)  long term goal (LTG);2 weeks  (Pended)   -RN     Progress/Outcome (Stairs Goal 1, PT)  goal met  (Pended)   -RN       User Key  (r) = Recorded By, (t) = Taken By, (c) = Cosigned By    Initials Name Provider Type    Shanna Aden, PT Student PT Student        Clinical Impression     Row Name 03/10/21 0907          Pain    Additional Documentation  Pain Scale: Numbers Pre/Post-Treatment (Group)  (Pended)   -RN     Row Name 03/10/21 0907          Pain Scale: Numbers Pre/Post-Treatment    Pretreatment Pain Rating  2/10  (Pended)   -RN     Posttreatment Pain Rating  0/10 - no pain  (Pended)   -RN     Pain Location - Side  Left  (Pended)  L IV insertional site  -RN     Pre/Posttreatment Pain Comment  NSG removed IV in L antecunbital space  (Pended)   -RN     Pain Intervention(s)  Ambulation/increased activity;Repositioned  (Pended)   -RN     Row Name 03/10/21 0907          Plan of Care  Review    Plan of Care Reviewed With  patient  (Pended)   -RN     Outcome Summary  Pt was modified independent with bed mobility, SBA for STS, gait, and stairs. Pt ambulated 278' with no AD. Pt ascended and descended 3 steps using L rail with no unsteadiness noted. Pt demonstrates decreased L ankle strategy during dynamic balance activites and slightly antalgic on LLE. Pt was lethargic and slightly fatigued at end of PT session. Pt is safe to be up at [facility]. D/C recommendation to home with OP PT for balance, coordinaiton, and strengthing to return to work.  (Pended)   -RN     Row Name 03/10/21 0907          Therapy Assessment/Plan (PT)    Rehab Potential (PT)  good, to achieve stated therapy goals  (Pended)   -RN     Criteria for Skilled Interventions Met (PT)  yes  (Pended)   -RN     Row Name 03/10/21 0907          Vital Signs    Post Systolic BP Rehab  117  (Pended)   -RN     Post Treatment Diastolic BP  65  (Pended)   -RN     Pretreatment Heart Rate (beats/min)  74  (Pended)   -RN     Posttreatment Heart Rate (beats/min)  71  (Pended)   -RN     Pre SpO2 (%)  97  (Pended)   -RN     O2 Delivery Pre Treatment  room air  (Pended)   -RN     Post SpO2 (%)  95  (Pended)   -RN     O2 Delivery Post Treatment  room air  (Pended)   -RN     Pre Patient Position  Sitting  (Pended)   -RN     Intra Patient Position  Standing  (Pended)   -RN     Post Patient Position  Supine  (Pended)   -RN     Row Name 03/10/21 0907          Positioning and Restraints    Pre-Treatment Position  sitting in chair/recliner  (Pended)   -RN     Post Treatment Position  bed  (Pended)   -RN     In Bed  side lying right;call light within reach;encouraged to call for assist;notified nsg  (Pended)   -RN       User Key  (r) = Recorded By, (t) = Taken By, (c) = Cosigned By    Initials Name Provider Type    Shanna Aden, PT Student PT Student        Outcome Measures     Row Name 03/10/21 0907          How much help from another person do you currently  need...    Turning from your back to your side while in flat bed without using bedrails?  4  (Pended)   -RN     Moving from lying on back to sitting on the side of a flat bed without bedrails?  4  (Pended)   -RN     Moving to and from a bed to a chair (including a wheelchair)?  4  (Pended)   -RN     Standing up from a chair using your arms (e.g., wheelchair, bedside chair)?  4  (Pended)   -RN     Climbing 3-5 steps with a railing?  4  (Pended)   -RN     To walk in hospital room?  4  (Pended)   -RN     AM-PAC 6 Clicks Score (PT)  24  (Pended)   -RN     Row Name 03/10/21 0907          Modified [place] Scale    Modified [place] Scale  1 - No significant disability despite symptoms.  Able to carry out all usual duties and activities.  (Pended)   -RN     Row Name 03/10/21 0907          Functional Assessment    Outcome Measure Options  AM-PAC 6 Clicks Basic Mobility (PT);Modified [place]  (Pended)   -RN       User Key  (r) = Recorded By, (t) = Taken By, (c) = Cosigned By    Initials Name Provider Type    RN Shanna Molina, PT Student PT Student        Physical Therapy Education                 Title: PT OT SLP Therapies (Done)     Topic: Physical Therapy (Done)     Point: Mobility training (Done)     Learning Progress Summary           Patient Acceptance, E,TB, VU,NR by RN at 3/10/2021 0907    Comment: Pt instructed on PT POC, PT scope, and D/C recommendation to OP PT.                   Point: Body mechanics (Done)     Learning Progress Summary           Patient Acceptance, E,TB, VU,NR by RN at 3/10/2021 0907    Comment: Pt instructed on PT POC, PT scope, and D/C recommendation to OP PT.                   Point: Precautions (Done)     Learning Progress Summary           Patient Acceptance, E,TB, VU,NR by RN at 3/10/2021 0907    Comment: Pt instructed on PT POC, PT scope, and D/C recommendation to OP PT.                               User Key     Initials Effective Dates Name Provider Type Discipline    RN 07/21/20 -  Jesse  Shanna PT Student PT Student PT              PT Recommendation and Plan  Planned Therapy Interventions (PT): (P) balance training, bed mobility training, gait training, home exercise program, motor coordination training, stair training, strengthening, stretching, ROM (range of motion), postural re-education, patient/family education, neuromuscular re-education, transfer training  Plan of Care Reviewed With: (P) patient  Outcome Summary: (P) Pt was modified independent with bed mobility, SBA for STS, gait, and stairs. Pt ambulated 278' with no AD. Pt ascended and descended 3 steps using L rail with no unsteadiness noted. Pt demonstrates decreased L ankle strategy during dynamic balance activites and slightly antalgic on LLE. Pt was lethargic and slightly fatigued at end of PT session. Pt is safe to be up at [facility]. D/C recommendation to home with OP PT for balance, coordinaiton, and strengthing to return to work.     Time Calculation:   PT Charges     Row Name 03/10/21 0907             Time Calculation    Start Time  0907  (Pended)   -RN      PT Received On  03/10/21  (Pended)   -RN         Timed Charges    15414 - PT Therapeutic Activity Minutes  --  (Pended)   -RN        User Key  (r) = Recorded By, (t) = Taken By, (c) = Cosigned By    Initials Name Provider Type    RN Shanna Molina, PT Student PT Student        Therapy Charges for Today     Code Description Service Date Service Provider Modifiers Qty    58615397497  PT EVAL LOW COMPLEXITY 4 3/10/2021 Shanna Molina, PT Student GP 1          PT G-Codes  Outcome Measure Options: AM-PAC 6 Clicks Daily Activity (OT)  AM-PAC 6 Clicks Score (PT): (P) 24  AM-PAC 6 Clicks Score (OT): 21  Modified [place] Scale: (P) 1 - No significant disability despite symptoms.  Able to carry out all usual duties and activities.    Shanna Molina PT Student  3/10/2021      Electronically signed by Shanna Molina PT Student at 03/10/21 1018     Shanna Molina PT Student at 03/10/21 0902   Version 1 of 3         Patient Name: Luis Darby  : 1995    MRN: [id number]                              Today's Date: 3/10/2021       Admit Date: 3/10/2021    Visit Dx: No diagnosis found.  Patient Active Problem List   Diagnosis   • Left sided numbness     Past Medical History:   Diagnosis Date   • Anxiety    • Depression    • Intermittent explosive disorder in adult      No past surgical history on file.  General Information     Row Name 03/10/21 0907          Physical Therapy Time and Intention    Document Type  discharge evaluation/summary  (Pended)   -RN     Mode of Treatment  individual therapy;physical therapy  (Pended)   -RN     Row Name 03/10/21 0907          General Information    Patient Profile Reviewed  yes  (Pended)   -RN     Prior Level of Function  independent:;all household mobility;gait;community mobility;ADL's;driving;work  (Pended)  pt states he is a   -RN     Existing Precautions/Restrictions  other (see comments)  (Pended)  decreased LLE senstation and LLE weakness  -RN     Barriers to Rehab  --  (Pended)  hx of psychiatric impairments  -RN     Row Name 03/10/21 0907          Living Environment    Lives With  child(tessa), dependent;spouse;parent(s)  (Pended)  Lives with mother  -RN     Row Name 03/10/21 0907          Home Main Entrance    Number of Stairs, Main Entrance  three  (Pended)   -RN     Stair Railings, Main Entrance  railing on left side (ascending)  (Pended)   -RN     Row Name 03/10/21 0907          Stairs Within Home, Primary    Number of Stairs, Within Home, Primary  none  (Pended)   -RN     Row Name 03/10/21 0907          Cognition    Orientation Status (Cognition)  oriented x 4  (Pended)   -RN     Row Name 03/10/21 0907          Safety Issues, Functional Mobility    Safety Issues Affecting Function (Mobility)  safety precaution awareness;safety precautions follow-through/compliance  (Pended)   -RN     Impairments Affecting Function (Mobility)   balance;strength;sensation/sensory awareness;coordination  (Pended)   -RN     Comment, Safety Issues/Impairments (Mobility)  Pt is slightly bradykinetic on LLE and requires more effort to perform LAQ repetitions x10 with slight dyscoordination. Pt demonstrates decreased L ankle strategy duirng standing marches but is able to walk with no external support and no LOB noted.  (Pended)   -RN       User Key  (r) = Recorded By, (t) = Taken By, (c) = Cosigned By    Initials Name Provider Type    RN Shanna Molina, PT Student PT Student        Mobility     Row Name 03/10/21 0907          Bed Mobility    Bed Mobility  sit-supine;scooting/bridging  (Pended)   -RN     Scooting/Bridging [place] (Bed Mobility)  supervision  (Pended)   -RN     Supine-Sit [place] (Bed Mobility)  supervision  (Pended)   -RN     Assistive Device (Bed Mobility)  bed rails  (Pended)   -RN     Comment (Bed Mobility)  Pt sitting in chair at beginning of PT session. Placed in bed at end of session. Pt states he is confortable in R S/L position.  (Pended)   -RN     Row Name 03/10/21 0907          Sit-Stand Transfer    Sit-Stand [place] (Transfers)  standby assist;other (see comments)  (Pended)  SBA for eval  -RN     Assistive Device (Sit-Stand Transfers)  other (see comments)  (Pended)  no AD  -RN     Row Name 03/10/21 0907          Gait/Stairs (Locomotion)    [place] Level (Gait)  standby assist;1 person assist  (Pended)   -RN     Assistive Device (Gait)  other (see comments)  (Pended)  no AD  -RN     Distance in Feet (Gait)  278'  (Pended)   -RN     Deviations/Abnormal Patterns (Gait)  base of support, wide;gait speed decreased;emily decreased  (Pended)   -RN     Left Sided Gait Deviations  heel strike decreased  (Pended)   -RN     [place] Level (Stairs)  stand by assist;1 person assist;other (see comments)  (Pended)  SBA for eval  -RN     Assistive Device (Stairs)  other (see comments)  (Pended)  no AD  -RN     Handrail  Location (Stairs)  left side (ascending);right side (descending)  (Pended)   -RN     Ascending Technique (Stairs)  step-over-step  (Pended)   -RN     Descending Technique (Stairs)  step-over-step  (Pended)   -RN     Comment (Gait/Stairs)  Pt is slightly ataxic on LLE with gait especially with forward tandem walking but pt notes previous balance difficulties before L sided numbness and weakness. Pt had no LOB and multitasking mild difficulty with multitasking during gait.  (Pended)   -RN     Row Name 03/10/21 0907          Mobility    Extremity Weight-bearing Status  other (see comments)  (Pended)  WBAT  -RN       User Key  (r) = Recorded By, (t) = Taken By, (c) = Cosigned By    Initials Name Provider Type    RN Shanna Molina, PT Student PT Student        Obj/Interventions     Row Name 03/10/21 0907          Range of Motion Comprehensive    General Range of Motion  bilateral lower extremity ROM WFL  (Pended)   -RN     Row Name 03/10/21 0907          Strength Comprehensive (MMT)    General Manual Muscle Testing (MMT) Assessment  lower extremity strength deficits identified  (Pended)   -RN     Comment, General Manual Muscle Testing (MMT) Assessment  RLE grossly 5/5, LLE grossly 4/5  (Pended)   -RN     Row Name 03/10/21 0907          Motor Skills    Motor Skills  coordination  (Pended)   -RN     Coordination  gross motor deficit;dysdiadochokinesia;left  (Pended)   -RN     Therapeutic Exercise  hip;knee  (Pended)   -RN     Row Name 03/10/21 0907          Hip (Therapeutic Exercise)    Hip (Therapeutic Exercise)  AROM (active range of motion)  (Pended)   -RN     Hip AROM (Therapeutic Exercise)  bilateral;standing;flexion;5 repetitions  (Pended)   -RN     Row Name 03/10/21 0907          Knee (Therapeutic Exercise)    Knee (Therapeutic Exercise)  AROM (active range of motion)  (Pended)   -RN     Knee AROM (Therapeutic Exercise)  bilateral;sitting;10 repetitions  (Pended)   -RN     Row Name 03/10/21 0907          Balance     Balance Assessment  sitting static balance;sitting dynamic balance;standing static balance;standing dynamic balance  (Pended)   -RN     Static Sitting Balance  WFL;supported;sitting in chair  (Pended)   -RN     Dynamic Sitting Balance  WFL;supported;sitting in chair  (Pended)   -RN     Static Standing Balance  WFL;unsupported;standing  (Pended)   -RN     Dynamic Standing Balance  WFL;unsupported;standing  (Pended)   -RN     Balance Interventions  standing;dynamic;minimal challenge;other (see comments)  (Pended)  Pt was able to change gait speed, 180 degree turn, perform tandem walking (5'), and B side stepping (5' each direction, 1x).  -RN     Comment, Balance  Pt shows decreased L ankle strategy with standing marches. Pt slightly ataxic on LLE during tandem walking.  (Pended)   -RN     Row Name 03/10/21 0907          Sensory Assessment (Somatosensory)    Sensory Assessment (Somatosensory)  other (see comments)  (Pended)  L LE sensation impaired, numbness L leg and L foot.  -RN       User Key  (r) = Recorded By, (t) = Taken By, (c) = Cosigned By    Initials Name Provider Type    RN Shanna Molina, PT Student PT Student        Goals/Plan     Row Name 03/10/21 0907          Gait Training Goal 1 (PT)    Activity/Assistive Device (Gait Training Goal 1, PT)  gait (walking locomotion);improve balance and speed;increase endurance/gait distance  (Pended)   -RN     [place] Level (Gait Training Goal 1, PT)  independent  (Pended)   -RN     Distance (Gait Training Goal 1, PT)  250'  (Pended)   -RN     Time Frame (Gait Training Goal 1, PT)  long term goal (LTG);2 weeks  (Pended)   -RN     Progress/Outcome (Gait Training Goal 1, PT)  goal met  (Pended)   -RN     Row Name 03/10/21 0907          Stairs Goal 1 (PT)    Activity/Assistive Device (Stairs Goal 1, PT)  ascending stairs;descending stairs;using handrail, left  (Pended)   -RN     [place] Level/Cues Needed (Stairs Goal 1, PT)  modified independence  (Pended)   -RN      Number of Stairs (Stairs Goal 1, PT)  3  (Pended)   -RN     Time Frame (Stairs Goal 1, PT)  long term goal (LTG);2 weeks  (Pended)   -RN     Progress/Outcome (Stairs Goal 1, PT)  goal met  (Pended)   -RN       User Key  (r) = Recorded By, (t) = Taken By, (c) = Cosigned By    Initials Name Provider Type    RN Shanna Molina, PT Student PT Student        Clinical Impression     Row Name 03/10/21 0907          Pain    Additional Documentation  Pain Scale: Numbers Pre/Post-Treatment (Group)  (Pended)   -RN     Row Name 03/10/21 0907          Pain Scale: Numbers Pre/Post-Treatment    Pretreatment Pain Rating  2/10  (Pended)   -RN     Posttreatment Pain Rating  0/10 - no pain  (Pended)   -RN     Pain Location - Side  Left  (Pended)  L IV insertional site  -RN     Pre/Posttreatment Pain Comment  NSG removed IV in L antecunbital space  (Pended)   -RN     Pain Intervention(s)  Ambulation/increased activity;Repositioned  (Pended)   -RN     Row Name 03/10/21 0907          Plan of Care Review    Plan of Care Reviewed With  patient  (Pended)   -RN     Outcome Summary  Pt was modified independent with bed mobility, SBA for STS, gait, and stairs. Pt ambulated 278' with no AD. Pt ascended and descended 3 steps using L rail with no unsteadiness noted. Pt demonstrates decreased L ankle strategy during dynamic balance activites and slightly antalgic on LLE. Pt was lethargic and slightly fatigued at end of PT session. D/C recommendation to home with OP PT for balance, coordinaiton, and strengthing to return to work.  (Pended)   -RN     Row Name 03/10/21 0907          Therapy Assessment/Plan (PT)    Rehab Potential (PT)  good, to achieve stated therapy goals  (Pended)   -RN     Criteria for Skilled Interventions Met (PT)  yes  (Pended)   -RN     Row Name 03/10/21 0907          Vital Signs    Post Systolic BP Rehab  117  (Pended)   -RN     Post Treatment Diastolic BP  65  (Pended)   -RN     Pretreatment Heart Rate (beats/min)  74   (Pended)   -RN     Posttreatment Heart Rate (beats/min)  71  (Pended)   -RN     Pre SpO2 (%)  97  (Pended)   -RN     O2 Delivery Pre Treatment  room air  (Pended)   -RN     Post SpO2 (%)  95  (Pended)   -RN     O2 Delivery Post Treatment  room air  (Pended)   -RN     Pre Patient Position  Sitting  (Pended)   -RN     Intra Patient Position  Standing  (Pended)   -RN     Post Patient Position  Supine  (Pended)   -RN     Row Name 03/10/21 0907          Positioning and Restraints    Pre-Treatment Position  sitting in chair/recliner  (Pended)   -RN     Post Treatment Position  bed  (Pended)   -RN     In Bed  side lying right;call light within reach;encouraged to call for assist;notified nsg  (Pended)   -RN       User Key  (r) = Recorded By, (t) = Taken By, (c) = Cosigned By    Initials Name Provider Type    RN Shanna Molina, PT Student PT Student        Outcome Measures     Row Name 03/10/21 0907          How much help from another person do you currently need...    Turning from your back to your side while in flat bed without using bedrails?  4  (Pended)   -RN     Moving from lying on back to sitting on the side of a flat bed without bedrails?  4  (Pended)   -RN     Moving to and from a bed to a chair (including a wheelchair)?  4  (Pended)   -RN     Standing up from a chair using your arms (e.g., wheelchair, bedside chair)?  4  (Pended)   -RN     Climbing 3-5 steps with a railing?  4  (Pended)   -RN     To walk in hospital room?  4  (Pended)   -RN     AM-PAC 6 Clicks Score (PT)  24  (Pended)   -RN     Row Name 03/10/21 0907          Modified Carlos Scale    Modified Carlos Scale  1 - No significant disability despite symptoms.  Able to carry out all usual duties and activities.  (Pended)   -RN     Row Name 03/10/21 0907          Functional Assessment    Outcome Measure Options  AM-PAC 6 Clicks Basic Mobility (PT);Modified [place]  (Pended)   -RN       User Key  (r) = Recorded By, (t) = Taken By, (c) = Cosigned By     Initials Name Provider Type    RN Shanna Molina, PT Student PT Student        Physical Therapy Education                 Title: PT OT SLP Therapies (Done)     Topic: Physical Therapy (Done)     Point: Mobility training (Done)     Learning Progress Summary           Patient Acceptance, E,TB, VU,NR by RN at 3/10/2021 0907    Comment: Pt instructed on PT POC, PT scope, and D/C recommendation to OP PT.                   Point: Body mechanics (Done)     Learning Progress Summary           Patient Acceptance, E,TB, VU,NR by RN at 3/10/2021 0907    Comment: Pt instructed on PT POC, PT scope, and D/C recommendation to OP PT.                   Point: Precautions (Done)     Learning Progress Summary           Patient Acceptance, E,TB, VU,NR by RN at 3/10/2021 0907    Comment: Pt instructed on PT POC, PT scope, and D/C recommendation to OP PT.                               User Key     Initials Effective Dates Name Provider Type Discipline    RN 07/21/20 -  Shanna Molina, PT Student PT Student PT              PT Recommendation and Plan  Planned Therapy Interventions (PT): (P) balance training, bed mobility training, gait training, home exercise program, motor coordination training, stair training, strengthening, stretching, ROM (range of motion), postural re-education, patient/family education, neuromuscular re-education, transfer training  Plan of Care Reviewed With: (P) patient  Outcome Summary: (P) Pt was modified independent with bed mobility, SBA for STS, gait, and stairs. Pt ambulated 278' with no AD. Pt ascended and descended 3 steps using L rail with no unsteadiness noted. Pt demonstrates decreased L ankle strategy during dynamic balance activites and slightly antalgic on LLE. Pt was lethargic and slightly fatigued at end of PT session. D/C recommendation to home with OP PT for balance, coordinaiton, and strengthing to return to work.     Time Calculation:   PT Charges     Row Name 03/10/21 0907             Time  Calculation    Start Time  907  (Pended)   -RN      PT Received On  03/10/21  (Pended)   -RN        User Key  (r) = Recorded By, (t) = Taken By, (c) = Cosigned By    Initials Name Provider Type    Shanna Aden, PT Student PT Student        Therapy Charges for Today     Code Description Service Date Service Provider Modifiers Qty    83275124546 HC PT EVAL LOW COMPLEXITY 4 3/10/2021 Shanna Molina, PT Student GP 1          PT G-Codes  Outcome Measure Options: AM-PAC 6 Clicks Daily Activity (OT)  AM-PAC 6 Clicks Score (PT): (P) 24  AM-PAC 6 Clicks Score (OT): 21  Modified Carlos Scale: (P) 1 - No significant disability despite symptoms.  Able to carry out all usual duties and activities.    Shanna Molina, PT Student  3/10/2021      Electronically signed by Shanna Molina PT Student at 03/10/21 1015          Occupational Therapy Notes (most recent note)      Chrystal Mckay, OT at 03/10/21 0859          Patient Name: Luis Darby  : 1995    MRN: [id number]                              Today's Date: 3/10/2021       Admit Date: 3/10/2021    Visit Dx: No diagnosis found.  Patient Active Problem List   Diagnosis   • Left sided numbness     Past Medical History:   Diagnosis Date   • Anxiety    • Depression    • Intermittent explosive disorder in adult      No past surgical history on file.  General Information     Row Name 03/10/21 0919          OT Time and Intention    Document Type  evaluation  -HK     Mode of Treatment  occupational therapy;individual therapy  -HK     Row Name 03/10/21 0919          General Information    Patient Profile Reviewed  yes  -HK     Prior Level of Function  independent:;all household mobility;community mobility;gait;transfer;bed mobility;ADL's;driving;using stairs;work  -HK     Existing Precautions/Restrictions  other (see comments) Decreased L UE sensation  -HK     Row Name 03/10/21 0919          Living Environment    Lives With  child(tessa), dependent;parent(s);spouse Lives with  wife, mother, 2 year old, and 11 mo old.  -     Row Name 03/10/21 0919          Home Main Entrance    Number of Stairs, Main Entrance  three  -HK     Stair Railings, Main Entrance  railing on left side (ascending)  -     Row Name 03/10/21 0919          Stairs Within Home, Primary    Number of Stairs, Within Home, Primary  none  -HK     Stair Railings, Within Home, Primary  none  -HK     Stairs Comment, Within Home, Primary  Pt reports he lives in [place] with wife, mother, and kids. Pt reports walk in shower.  -     Row Name 03/10/21 0919          Cognition    Orientation Status (Cognition)  oriented x 4;verbal cues/prompts needed for orientation extra time for month  -     Row Name 03/10/21 0919          Safety Issues, Functional Mobility    Safety Issues Affecting Function (Mobility)  safety precaution awareness;safety precautions follow-through/compliance  -HK     Impairments Affecting Function (Mobility)  balance;sensation/sensory awareness;strength  -HK       User Key  (r) = Recorded By, (t) = Taken By, (c) = Cosigned By    Initials Name Provider Type    HK Chrystal Mckay, OT Occupational Therapist          Mobility/ADL's     Row Name 03/10/21 0921          Bed Mobility    Bed Mobility  scooting/bridging;supine-sit  -HK     Scooting/Bridging [place] (Bed Mobility)  modified independence  -HK     Supine-Sit [place] (Bed Mobility)  modified independence  -HK     Bed Mobility, Safety Issues  decreased use of arms for pushing/pulling;decreased use of legs for bridging/pushing  -HK     Assistive Device (Bed Mobility)  head of bed elevated  -HK     Comment (Bed Mobility)  Pt advanced to EOB with no assist from OT.  -     Row Name 03/10/21 0921          Transfers    Transfers  sit-stand transfer;toilet transfer  -HK     Sit-Stand [place] (Transfers)  standby assist  -HK     [place] Level (Toilet Transfer)  independent  -HK     Assistive Device (Toilet Transfer)  raised toilet seat   -HK     Row Name 03/10/21 0921          Sit-Stand Transfer    Assistive Device (Sit-Stand Transfers)  other (see comments) none  -HK     Row Name 03/10/21 0921          Toilet Transfer    Type (Toilet Transfer)  sit-stand;stand-sit  -     Row Name 03/10/21 0921          Functional Mobility    Functional Mobility- Ind. Level  supervision required  -HK     Functional Mobility- Device  -- none  -     Functional Mobility-Distance (Feet)  20  -HK     Functional Mobility- Safety Issues  balance decreased during turns;step length decreased  -     Row Name 03/10/21 0921          Activities of Daily Living    BADL Assessment/Intervention  lower body dressing;toileting  -     Row Name 03/10/21 0921          Lower Body Dressing Assessment/Training    [place] Level (Lower Body Dressing)  doff;don;socks;independent  -HK     Position (Lower Body Dressing)  edge of bed sitting  -     Comment (Lower Body Dressing)  extra time/effort  -     Row Name 03/10/21 0921          Toileting Assessment/Training    [place] Level (Toileting)  perform perineal hygiene;adjust/manage clothing;independent  -HK     Position (Toileting)  unsupported sitting;unsupported standing  -       User Key  (r) = Recorded By, (t) = Taken By, (c) = Cosigned By    Initials Name Provider Type     Chrystal Mckay, OT Occupational Therapist        Obj/Interventions     Row Name 03/10/21 0940          Sensory Assessment (Somatosensory)    Sensory Assessment (Somatosensory)  left UE  -HK     Left UE Sensory Assessment  light touch awareness;general sensation;light touch localization  -     Sensory Subjective Reports  insensate complains of dullness  -     Row Name 03/10/21 0940          Sensory Interventions    Comment, Sensory Intervention  Pt presents with decreased FM coordination in L hand as well as L hand weakness. OT issued yellow foam block and hand out on exercise. OT also issued hand outs on coordination exercise for L UE.  -      Row Name 03/10/21 0940          Vision Assessment/Intervention    Visual Impairment/Limitations  WFL  -HK     Row Name 03/10/21 0940          Range of Motion Comprehensive    General Range of Motion  no range of motion deficits identified  -     Comment, General Range of Motion  B UE WFL  -HK     Row Name 03/10/21 0940          Strength Comprehensive (MMT)    General Manual Muscle Testing (MMT) Assessment  upper extremity strength deficits identified  -     Row Name 03/10/21 0940          Upper Extremity (Manual Muscle Testing)    Upper Extremity: Manual Muscle Testing (MMT)  left shoulder strength deficit  -HK     Row Name 03/10/21 0940          Balance    Balance Assessment  sitting static balance;sitting dynamic balance;standing static balance;standing dynamic balance  -HK     Static Sitting Balance  WNL;unsupported;sitting, edge of bed  -HK     Dynamic Sitting Balance  WFL;unsupported;sitting, edge of bed  -HK     Static Standing Balance  unsupported;standing  -HK     Dynamic Standing Balance  WFL;unsupported;standing  -HK     Row Name 03/10/21 0940          Left Shoulder (Manual Muscle Testing)    Left Shoulder Manual Muscle Testing (MMT)  flexion  -HK     MMT: Flexion, Left Shoulder  flexion  -HK     MMT, Gross Movement: Left Shoulder Flexion  (4/5) good  -HK       User Key  (r) = Recorded By, (t) = Taken By, (c) = Cosigned By    Initials Name Provider Type    HK Chrystal Mckay, OT Occupational Therapist        Goals/Plan     Row Name 03/10/21 0951          Transfer Goal 1 (OT)    Activity/Assistive Device (Transfer Goal 1, OT)  sit-to-stand/stand-to-sit;toilet  -HK     [place] Level/Cues Needed (Transfer Goal 1, OT)  independent  -HK     Time Frame (Transfer Goal 1, OT)  by discharge  -HK     Progress/Outcome (Transfer Goal 1, OT)  goal ongoing  -     Row Name 03/10/21 0951          ROM Goal 1 (OT)    ROM Goal 1 (OT)  Pt will be able to adequately demosntrate foam block and coordination exercises  with L UE.  -HK     Time Frame (ROM Goal 1, OT)  by discharge  -HK     Progress/Outcome (ROM Goal 1, OT)  goal ongoing  -HK       User Key  (r) = Recorded By, (t) = Taken By, (c) = Cosigned By    Initials Name Provider Type    HK Chrystal Mckay, OT Occupational Therapist        Clinical Impression     Row Name 03/10/21 0943          Pain Assessment    Additional Documentation  Pain Scale: Numbers Pre/Post-Treatment (Group)  -     Row Name 03/10/21 0943          Pain Scale: Numbers Pre/Post-Treatment    Pretreatment Pain Rating  3/10  -HK     Posttreatment Pain Rating  3/10  -HK     Pain Location - Side  Left  -HK     Pain Location - Orientation  posterior  -HK     Pain Location  hand  -HK     Pain Intervention(s)  Ambulation/increased activity;Repositioned  -     Row Name 03/10/21 0943          Plan of Care Review    Plan of Care Reviewed With  patient  -HK     Progress  improving  -HK     Outcome Summary  OT eval complete. Pt completes bed mobility and CI and transfers with SBA and no AE. Pt ambulates with supervision and no AE. Pt independent with LBD and toileting this date. Pt does demosntrate L UE weakness and decreased L hand FM coordination. OT issued foam blocks as well as strengthening hand out and coordination exercises to improve strength and coordination in LUE. Recommend d/c home with OP OT services.  -     Row Name 03/10/21 0943          Therapy Assessment/Plan (OT)    Patient/Family Therapy Goal Statement (OT)  Pt would like to improve and return home.  -     Rehab Potential (OT)  good, to achieve stated therapy goals  -     Criteria for Skilled Therapeutic Interventions Met (OT)  yes;skilled treatment is necessary  -     Therapy Frequency (OT)  daily  -     Row Name 03/10/21 0943          Therapy Plan Review/Discharge Plan (OT)    Anticipated Discharge Disposition (OT)  home with assist;home with outpatient therapy services  -     Row Name 03/10/21 0943          Vital Signs    Pre  Systolic BP Rehab  128  -HK     Pre Treatment Diastolic BP  76  -HK     Post Systolic BP Rehab  138  -HK     Post Treatment Diastolic BP  86  -HK     O2 Delivery Pre Treatment  room air  -HK     O2 Delivery Intra Treatment  room air  -HK     O2 Delivery Post Treatment  room air  -HK     Pre Patient Position  Supine  -HK     Intra Patient Position  Standing  -HK     Post Patient Position  Sitting  -HK     Row Name 03/10/21 0943          Positioning and Restraints    Pre-Treatment Position  in bed  -HK     Post Treatment Position  chair  -HK     In Chair  notified nsg;reclined;call light within reach;encouraged to call for assist RN d/c chair alarm  -HK       User Key  (r) = Recorded By, (t) = Taken By, (c) = Cosigned By    Initials Name Provider Type    Chrystal Sanchez OT Occupational Therapist        Outcome Measures     Row Name 03/10/21 0952          How much help from another is currently needed...    Putting on and taking off regular lower body clothing?  4  -HK     Bathing (including washing, rinsing, and drying)  3  -HK     Toileting (which includes using toilet bed pan or urinal)  4  -HK     Putting on and taking off regular upper body clothing  4  -HK     Taking care of personal grooming (such as brushing teeth)  3  -HK     Eating meals  3  -HK     AM-PAC 6 Clicks Score (OT)  21  -HK     Row Name 03/10/21 0952          Functional Assessment    Outcome Measure Options  AM-PAC 6 Clicks Daily Activity (OT)  -HK       User Key  (r) = Recorded By, (t) = Taken By, (c) = Cosigned By    Initials Name Provider Type    Chrystal Sanchez OT Occupational Therapist        Occupational Therapy Education                 Title: PT OT SLP Therapies (Done)     Topic: Occupational Therapy (Done)     Point: ADL training (Done)     Description:   Instruct learner(s) on proper safety adaptation and remediation techniques during self care or transfers.   Instruct in proper use of assistive devices.              Learning Progress  Summary           Patient Acceptance, E,TB,D, VU,NR,DU by  at 3/10/2021 0952                   Point: Home exercise program (Done)     Description:   Instruct learner(s) on appropriate technique for monitoring, assisting and/or progressing therapeutic exercises/activities.              Learning Progress Summary           Patient Acceptance, E,TB,D, VU,NR,DU by  at 3/10/2021 0952                   Point: Precautions (Done)     Description:   Instruct learner(s) on prescribed precautions during self-care and functional transfers.              Learning Progress Summary           Patient Acceptance, E,TB,D, VU,NR,DU by  at 3/10/2021 0952                   Point: Body mechanics (Done)     Description:   Instruct learner(s) on proper positioning and spine alignment during self-care, functional mobility activities and/or exercises.              Learning Progress Summary           Patient Acceptance, E,TB,D, VU,NR,DU by  at 3/10/2021 0952                               User Key     Initials Effective Dates Name Provider Type Discipline     03/07/18 -  Chrystal Mckay, OT Occupational Therapist OT              OT Recommendation and Plan  Therapy Frequency (OT): daily  Plan of Care Review  Plan of Care Reviewed With: patient  Progress: improving  Outcome Summary: OT eval complete. Pt completes bed mobility and CI and transfers with SBA and no AE. Pt ambulates with supervision and no AE. Pt independent with LBD and toileting this date. Pt does demosntrate L UE weakness and decreased L hand FM coordination. OT issued foam blocks as well as strengthening hand out and coordination exercises to improve strength and coordination in LUE. Recommend d/c home with OP OT services.     Time Calculation:   Time Calculation- OT     Row Name 03/10/21 0829             Time Calculation- OT    OT Start Time  0829  -      OT Received On  03/10/21  [facility]      OT Goal Re-Cert Due Date  03/20/21  -         Timed Charges    98756 - OT  Therapeutic Exercise Minutes  10  -HK        User Key  (r) = Recorded By, (t) = Taken By, (c) = Cosigned By    Initials Name Provider Type     Chrystal Mckay OT Occupational Therapist        Therapy Charges for Today     Code Description Service Date Service Provider Modifiers Qty    01358580582  OT THER PROC EA 15 MIN 3/10/2021 Chrystal Mckay OT GO 1    [id number]  OT EVAL LOW COMPLEXITY 3 3/10/2021 Chrystal Mckay OT GO 1               Chrystal Mckay OT  3/10/2021    Electronically signed by Chrystal Mckay OT at 03/10/21 0954       Speech Language Pathology Notes (most recent note)    No notes exist for this encounter.

## 2021-07-21 NOTE — PROGRESS NOTES
Patient: Luis Darby    YOB: 1995    Date: 07/23/2021    Primary Care Provider: Lida Durbin APRN    Reason for Consultation: Lesion    Chief Complaint   Patient presents with   • perirectal abscess       Subjective .     History of present illness:  I saw the patient in the office  today as a consultation for evaluation and treatment of a perirectal abscess.  Patient states that a month ago he had this drained in the emergency room and subsequently need another drainage procedure about 2 weeks later.  Currently he states that it is feeling fine.  No pain or drainage.    The following portions of the patient's history were reviewed and updated as appropriate: allergies, current medications, past family history, past medical history, past social history, past surgical history and problem list.    Review of Systems   Constitutional: Negative for chills, fever and unexpected weight change.   HENT: Negative for trouble swallowing and voice change.    Eyes: Negative for visual disturbance.   Respiratory: Negative for apnea, cough, chest tightness, shortness of breath and wheezing.    Cardiovascular: Negative for chest pain, palpitations and leg swelling.   Gastrointestinal: Positive for rectal pain. Negative for abdominal distention, abdominal pain, anal bleeding, blood in stool, constipation, diarrhea, nausea and vomiting.   Endocrine: Negative for cold intolerance and heat intolerance.   Genitourinary: Negative for difficulty urinating, dysuria, flank pain, scrotal swelling and testicular pain.   Musculoskeletal: Negative for back pain, gait problem and joint swelling.   Skin: Negative for color change, rash and wound.   Neurological: Negative for dizziness, syncope, speech difficulty, weakness, numbness and headaches.   Hematological: Negative for adenopathy. Does not bruise/bleed easily.   Psychiatric/Behavioral: Negative for confusion. The patient is not nervous/anxious.        History:  Past  "Medical History:   Diagnosis Date   • Anxiety    • Depression    • Intermittent explosive disorder in adult        History reviewed. No pertinent surgical history.    Family History   Problem Relation Age of Onset   • Cancer Mother        Social History     Tobacco Use   • Smoking status: Current Every Day Smoker     Packs/day: 2.00   Substance Use Topics   • Alcohol use: Yes     Comment: occasionally   • Drug use: Never        Allergies:  No Known Allergies    Medications:    Current Outpatient Medications:   •  buPROPion XL (WELLBUTRIN XL) 300 MG 24 hr tablet, , Disp: , Rfl:   •  clindamycin (CLEOCIN) 150 MG capsule, TAKE 1 CAPSULE BY MOUTH FOUR TIMES DAILY UNTIL FINISHED, Disp: , Rfl:   •  desvenlafaxine (PRISTIQ) 50 MG 24 hr tablet, Take 50 mg by mouth Daily., Disp: , Rfl:   •  meloxicam (MOBIC) 15 MG tablet, Take 15 mg by mouth Daily., Disp: , Rfl:   •  OXcarbazepine (TRILEPTAL) 150 MG tablet, , Disp: , Rfl:   •  pantoprazole (PROTONIX) 40 MG EC tablet, Take 40 mg by mouth Daily., Disp: , Rfl:   •  Vraylar 4.5 MG capsule, , Disp: , Rfl:     Objective     Vital Signs:   Vitals:    07/23/21 1041   BP: 136/80   Pulse: 84   Temp: 97.3 °F (36.3 °C)   SpO2: 99%   Weight: 135 kg (298 lb)   Height: 185.4 cm (73\")       Physical Exam:  General Appearance:    Alert, cooperative, in no acute distress   Head:    Normocephalic, without obvious abnormality, atraumatic   Eyes:            Normal.  No scleral icterus.  PERRLA    Lungs:     Clear to auscultation,respirations regular, even and                  unlabored    Heart:    Regular rhythm and normal rate, normal S1 and S2, no            murmur   Abdomen:     Normal bowel sounds, no masses, no organomegaly, soft        non-tender, non-distended, no guarding,    Extremities:   Moves all extremities well, no edema, no cyanosis, no             redness   Skin:  In the left side of the perineum there is a drainage site that has healed.  There is no evidence of ongoing " inflammation.  Nontender.   Neurologic:   Normal without gross deficits.   Psychiatric: No evidence of depression or anxiety          Results Review:   I reviewed the patient's new clinical results.  Labs and cultures were reviewed    Review of Systems was reviewed and confirmed as accurate as documented by the MA.    Assessment/Plan     1. Perirectal abscess        Although he has been diagnosed with a possible perirectal abscess it seems that the drainage site is quite a bit anterior to the anus and the perineum.  Pathology also revealed Staphylococcus hemolyticus which is not a typical perirectal abscess bacteria.  He is on clindamycin and the cultures are resistant to this.  Since he is asymptomatic currently and no evidence of ongoing infection I would have him stop his antibiotics.  Follow-up if he has any recurrent symptoms.  Otherwise as needed.    Electronically signed by Tano Genao MD  07/23/21  11:14 EDT

## 2021-07-23 ENCOUNTER — OFFICE VISIT (OUTPATIENT)
Dept: SURGERY | Facility: CLINIC | Age: 26
End: 2021-07-23

## 2021-07-23 VITALS
HEIGHT: 73 IN | HEART RATE: 84 BPM | DIASTOLIC BLOOD PRESSURE: 80 MMHG | OXYGEN SATURATION: 99 % | SYSTOLIC BLOOD PRESSURE: 136 MMHG | BODY MASS INDEX: 39.49 KG/M2 | TEMPERATURE: 97.3 F | WEIGHT: 298 LBS

## 2021-07-23 DIAGNOSIS — K61.1 PERIRECTAL ABSCESS: Primary | ICD-10-CM

## 2021-07-23 PROCEDURE — 99203 OFFICE O/P NEW LOW 30 MIN: CPT | Performed by: SURGERY

## 2021-07-23 RX ORDER — OXCARBAZEPINE 150 MG/1
150 TABLET, FILM COATED ORAL 2 TIMES DAILY
COMMUNITY
Start: 2021-07-14 | End: 2022-03-15 | Stop reason: SDUPTHER

## 2021-07-23 RX ORDER — CLINDAMYCIN HYDROCHLORIDE 150 MG/1
CAPSULE ORAL
COMMUNITY
Start: 2021-06-27 | End: 2022-02-14

## 2021-07-23 RX ORDER — DESVENLAFAXINE SUCCINATE 50 MG/1
100 TABLET, EXTENDED RELEASE ORAL DAILY
COMMUNITY
Start: 2021-07-08 | End: 2022-02-14

## 2021-07-23 RX ORDER — PANTOPRAZOLE SODIUM 40 MG/1
40 TABLET, DELAYED RELEASE ORAL DAILY
COMMUNITY
Start: 2021-06-22 | End: 2022-02-01

## 2021-07-23 RX ORDER — BUPROPION HYDROCHLORIDE 300 MG/1
TABLET ORAL
COMMUNITY
Start: 2021-07-11 | End: 2022-03-15 | Stop reason: SDUPTHER

## 2021-07-23 RX ORDER — CARIPRAZINE 4.5 MG/1
4.5 CAPSULE, GELATIN COATED ORAL DAILY
COMMUNITY
Start: 2021-07-13 | End: 2022-02-14

## 2021-07-23 RX ORDER — MELOXICAM 15 MG/1
15 TABLET ORAL DAILY
COMMUNITY
Start: 2021-06-22 | End: 2022-02-14

## 2022-02-01 ENCOUNTER — OFFICE VISIT (OUTPATIENT)
Dept: GASTROENTEROLOGY | Facility: CLINIC | Age: 27
End: 2022-02-01

## 2022-02-01 VITALS
DIASTOLIC BLOOD PRESSURE: 88 MMHG | WEIGHT: 303.4 LBS | SYSTOLIC BLOOD PRESSURE: 155 MMHG | TEMPERATURE: 98.9 F | HEIGHT: 73 IN | BODY MASS INDEX: 40.21 KG/M2

## 2022-02-01 DIAGNOSIS — R19.7 DIARRHEA, UNSPECIFIED TYPE: ICD-10-CM

## 2022-02-01 DIAGNOSIS — E78.5 HYPERLIPIDEMIA, UNSPECIFIED HYPERLIPIDEMIA TYPE: ICD-10-CM

## 2022-02-01 DIAGNOSIS — K21.9 GASTROESOPHAGEAL REFLUX DISEASE, UNSPECIFIED WHETHER ESOPHAGITIS PRESENT: ICD-10-CM

## 2022-02-01 DIAGNOSIS — R11.2 INTRACTABLE VOMITING WITH NAUSEA, UNSPECIFIED VOMITING TYPE: Primary | ICD-10-CM

## 2022-02-01 DIAGNOSIS — R74.01 ELEVATED ALT MEASUREMENT: ICD-10-CM

## 2022-02-01 PROCEDURE — 99244 OFF/OP CNSLTJ NEW/EST MOD 40: CPT | Performed by: PHYSICIAN ASSISTANT

## 2022-02-01 RX ORDER — IBUPROFEN 800 MG/1
TABLET ORAL
COMMUNITY
Start: 2022-01-11 | End: 2022-06-03

## 2022-02-01 RX ORDER — FAMOTIDINE 40 MG/1
40 TABLET, FILM COATED ORAL 2 TIMES DAILY
Qty: 60 TABLET | Refills: 5 | Status: SHIPPED | OUTPATIENT
Start: 2022-02-01 | End: 2022-02-14

## 2022-02-01 RX ORDER — OMEPRAZOLE 40 MG/1
40 CAPSULE, DELAYED RELEASE ORAL DAILY
Qty: 30 CAPSULE | Refills: 5 | Status: SHIPPED | OUTPATIENT
Start: 2022-02-01 | End: 2022-06-23 | Stop reason: SDDI

## 2022-02-01 NOTE — PATIENT INSTRUCTIONS
Stop all soda intake  Stop smoking cigarettes  Start omeprazole, stop protonix  Start famotidine 40 mg twice daily  Follow diet recommendations  Labs  Stool testing      Food Choices for Gastroesophageal Reflux Disease, Adult  When you have gastroesophageal reflux disease (GERD), the foods you eat and your eating habits are very important. Choosing the right foods can help ease your discomfort. Think about working with a food expert (dietitian) to help you make good choices.  What are tips for following this plan?  Reading food labels  · Look for foods that are low in saturated fat. Foods that may help with your symptoms include:  ? Foods that have less than 5% of daily value (DV) of fat.  ? Foods that have 0 grams of trans fat.  Cooking  · Do not ortiz your food.  · Cook your food by baking, steaming, grilling, or broiling. These are all methods that do not need a lot of fat for cooking.  · To add flavor, try to use herbs that are low in spice and acidity.  Meal planning    · Choose healthy foods that are low in fat, such as:  ? Fruits and vegetables.  ? Whole grains.  ? Low-fat dairy products.  ? Lean meats, fish, and poultry.  · Eat small meals often instead of eating 3 large meals each day. Eat your meals slowly in a place where you are relaxed. Avoid bending over or lying down until 2-3 hours after eating.  · Limit high-fat foods such as fatty meats or fried foods.  · Limit your intake of fatty foods, such as oils, butter, and shortening.  · Avoid the following as told by your doctor:  ? Foods that cause symptoms. These may be different for different people. Keep a food diary to keep track of foods that cause symptoms.  ? Alcohol.  ? Drinking a lot of liquid with meals.  ? Eating meals during the 2-3 hours before bed.    Lifestyle  · Stay at a healthy weight. Ask your doctor what weight is healthy for you. If you need to lose weight, work with your doctor to do so safely.  · Exercise for at least 30 minutes on 5  or more days each week, or as told by your doctor.  · Wear loose-fitting clothes.  · Do not smoke or use any products that contain nicotine or tobacco. If you need help quitting, ask your doctor.  · Sleep with the head of your bed higher than your feet. Use a wedge under the mattress or blocks under the bed frame to raise the head of the bed.  · Chew sugar-free gum after meals.  What foods should eat?    Eat a healthy, well-balanced diet of fruits, vegetables, whole grains, low-fat dairy products, lean meats, fish, and poultry. Each person is different.  Foods that may cause symptoms in one person may not cause any symptoms in another person. Work with your doctor to find foods that are safe for you.  The items listed above may not be a complete list of what you can eat and drink. Contact a food expert for more options.  What foods should I avoid?  Limiting some of these foods may help in managing the symptoms of GERD. Everyone is different. Talk with a food expert or your doctor to help you find the exact foods to avoid, if any.  Fruits  Any fruits prepared with added fat. Any fruits that cause symptoms. For some people, this may include citrus fruits, such as oranges, grapefruit, pineapple, and therese.  Vegetables  Deep-fried vegetables. French fries. Any vegetables prepared with added fat. Any vegetables that cause symptoms. For some people, this may include tomatoes and tomato products, chili peppers, onions and garlic, and horseradish.  Grains  Pastries or quick breads with added fat.  Meats and other proteins  High-fat meats, such as fatty beef or pork, hot dogs, ribs, ham, sausage, salami, and welch. Fried meat or protein, including fried fish and fried chicken. Nuts and nut butters, in large amounts.  Dairy  Whole milk and chocolate milk. Sour cream. Cream. Ice cream. Cream cheese. Milkshakes.  Fats and oils  Butter. Margarine. Shortening. Ghee.  Beverages  Coffee and tea, with or without caffeine.  Carbonated beverages. Sodas. Energy drinks. Fruit juice made with acidic fruits, such as orange or grapefruit. Tomato juice. Alcoholic drinks.  Sweets and desserts  Chocolate and cocoa. Donuts.  Seasonings and condiments  Pepper. Peppermint and spearmint. Added salt. Any condiments, herbs, or seasonings that cause symptoms. For some people, this may include otto, hot sauce, or vinegar-based salad dressings.  The items listed above may not be a complete list of what you should not eat and drink. Contact a food expert for more options.  Questions to ask your doctor  Diet and lifestyle changes are often the first steps that are taken to manage symptoms of GERD. If diet and lifestyle changes do not help, talk with your doctor about taking medicines.  Where to find more information  · [employer]: aboutgerd.org  Summary  · When you have GERD, food and lifestyle choices are very important in easing your symptoms.  · Eat small meals often instead of 3 large meals a day. Eat your meals slowly and in a place where you are relaxed.  · Avoid bending over or lying down until 2-3 hours after eating.  · Limit high-fat foods such as fatty meats or fried foods.  This information is not intended to replace advice given to you by your health care provider. Make sure you discuss any questions you have with your health care provider.  Document Revised: 06/28/2021 Document Reviewed: 06/28/2021  Elsevier Patient Education © 2021 [employer]

## 2022-02-01 NOTE — PROGRESS NOTES
"     New Patient Consult      Date: 2022   Patient Name: Luis Darby  MRN: [id number]  : 1995     Primary Care Provider: Lida Durbin APRN  Referring Provider: Ramakrishna    Chief Complaint   Patient presents with   • Abdominal Pain     Patient states every time he eats he has diarrhea or vomiting.   • Diarrhea   • Vomiting     History of Present Illness: Luis Darby is a 26 y.o. male who is here today as a consultation with Gastroenterology for evaluation of Abdominal Pain, Diarrhea, and Vomiting.    For the past 1-2 years he has been having vomiting after eating, worse now and reports having diarrhea even without eating. He is hungry all the time and eats all throughout the day. He will have 6-10 loose stools which are usually large in amount and in a \"pile\" in the toilet. No skip days between bowel movements. No rectal bleeding or black stools. He has been taking ibuprofen as needed due to knee pain. He has gained some weight recently, not lost any. No abdominal pain other than mild and intermittent generalized pain before a bowel movement. Denies having any heartburn. Sometimes has reflux into his mouth, wakes in the morning with it. He has been taking protonix 40 mg once daily for 1-2 years but feels that it does not help much with reflux symptoms. His mother had gallbladder disease but no other family history of GI disease. There is no known family history of colon cancer or colon polyps. He drinks alcohol about 2 times per month, small in amounts. He smokes 2-2.5 ppd cigarettes. No marijuana use. He drinks 6 (16 oz) bottles of Moutain Dew daily.    Subjective      Past Medical History:   Diagnosis Date   • ADHD    • Anxiety    • Depression    • GERD (gastroesophageal reflux disease)    • Hypersomnolence    • Intermittent explosive disorder in adult    • Mood disorder (HCC)    • Patellofemoral stress syndrome      Past Surgical History:   Procedure Laterality Date   • INCISION AND DRAINAGE " ABSCESS  06/2021     Family History   Problem Relation Age of Onset   • Cancer Mother      Social History     Socioeconomic History   • Marital status:    Tobacco Use   • Smoking status: Current Every Day Smoker     Packs/day: 2.00   Substance and Sexual Activity   • Alcohol use: Yes     Comment: occasionally   • Drug use: Never     Current Outpatient Medications:   •  buPROPion XL (WELLBUTRIN XL) 300 MG 24 hr tablet, , Disp: , Rfl:   •  clindamycin (CLEOCIN) 150 MG capsule, TAKE 1 CAPSULE BY MOUTH FOUR TIMES DAILY UNTIL FINISHED, Disp: , Rfl:   •  desvenlafaxine (PRISTIQ) 50 MG 24 hr tablet, Take 50 mg by mouth Daily., Disp: , Rfl:   •  meloxicam (MOBIC) 15 MG tablet, Take 15 mg by mouth Daily., Disp: , Rfl:   •  OXcarbazepine (TRILEPTAL) 150 MG tablet, Take 150 mg by mouth 2 (Two) Times a Day., Disp: , Rfl:   •  pantoprazole (PROTONIX) 40 MG EC tablet, Take 40 mg by mouth Daily., Disp: , Rfl:   •  triamcinolone (KENALOG) 0.1 % ointment, Apply 1 application topically to the appropriate area as directed 2 (Two) Times a Day., Disp: , Rfl:   •  Vraylar 4.5 MG capsule, Take 4.5 mg by mouth Daily., Disp: , Rfl:     No Known Allergies    The following portions of the patient's history were reviewed and updated as appropriate: allergies, current medications, past family history, past medical history, past social history, past surgical history and problem list.    Objective     Physical Exam  Vitals reviewed.   Constitutional:       General: He is not in acute distress.     Appearance: Normal appearance. He is well-developed. He is obese. He is not ill-appearing or diaphoretic.   HENT:      Head: Normocephalic and atraumatic.      Right Ear: External ear normal.      Left Ear: External ear normal.      Nose: Nose normal.      Mouth/Throat:      Comments: Wearing a mask  Eyes:      General: No scleral icterus.        Right eye: No discharge.         Left eye: No discharge.      Conjunctiva/sclera: Conjunctivae  "normal.   Neck:      Vascular: No JVD.   Cardiovascular:      Rate and Rhythm: Normal rate and regular rhythm.      Heart sounds: Normal heart sounds. No murmur heard.  No friction rub. No gallop.    Pulmonary:      Effort: Pulmonary effort is normal. No respiratory distress.      Breath sounds: Normal breath sounds. No wheezing or rales.   Chest:      Chest wall: No tenderness.   Abdominal:      General: Bowel sounds are normal. There is no distension.      Palpations: Abdomen is soft. There is no mass.      Tenderness: There is abdominal tenderness (RUQ and LUQ, mild). There is no guarding.   Musculoskeletal:         General: No deformity. Normal range of motion.      Cervical back: Normal range of motion.   Skin:     General: Skin is warm and dry.      Findings: No erythema or rash.   Neurological:      Mental Status: He is alert and oriented to person, place, and time.      Coordination: Coordination normal.   Psychiatric:         Mood and Affect: Mood normal.         Behavior: Behavior normal.         Thought Content: Thought content normal.         Judgment: Judgment normal.       Vitals:    02/01/22 1337   BP: 155/88   BP Location: Right arm   Patient Position: Sitting   Cuff Size: Adult   Temp: 98.9 °F (37.2 °C)   Weight: (!) 138 kg (303 lb 6.4 oz)   Height: 185.4 cm (73\")        Results Review:   I have reviewed the patient's new clinical and imaging results.    Admission on 03/10/2021, Discharged on 03/10/2021   Component Date Value Ref Range Status   • COVID19 03/10/2021 Not Detected  Not Detected - Ref. Range Final   • Influenza A PCR 03/10/2021 Not Detected  Not Detected Final   • Influenza B PCR 03/10/2021 Not Detected  Not Detected Final   • Hemoglobin A1C 03/10/2021 5.10  4.80 - 5.60 % Final   • Total Cholesterol 03/10/2021 158  0 - 200 mg/dL Final   • Triglycerides 03/10/2021 517* 0 - 150 mg/dL Final   • HDL Cholesterol 03/10/2021 19* 40 - 60 mg/dL Final   • LDL Cholesterol  03/10/2021 60  0 - 100 " mg/dL Final   • VLDL Cholesterol 03/10/2021 79* 5 - 40 mg/dL Final   • LDL/HDL Ratio 03/10/2021 1.87   Final   • Glucose 03/10/2021 105* 65 - 99 mg/dL Final   • BUN 03/10/2021 13  6 - 20 mg/dL Final   • Creatinine 03/10/2021 0.87  0.76 - 1.27 mg/dL Final   • Sodium 03/10/2021 137  136 - 145 mmol/L Final   • Potassium 03/10/2021 3.3* 3.5 - 5.2 mmol/L Final    Slight hemolysis detected by analyzer. Results may be affected.   • Chloride 03/10/2021 102  98 - 107 mmol/L Final   • CO2 03/10/2021 23.0  22.0 - 29.0 mmol/L Final   • Calcium 03/10/2021 8.9  8.6 - 10.5 mg/dL Final   • Total Protein 03/10/2021 6.4  6.0 - 8.5 g/dL Final   • Albumin 03/10/2021 4.40  3.50 - 5.20 g/dL Final   • ALT (SGPT) 03/10/2021 51* 1 - 41 U/L Final   • AST (SGOT) 03/10/2021 26  1 - 40 U/L Final   • Alkaline Phosphatase 03/10/2021 69  39 - 117 U/L Final   • Total Bilirubin 03/10/2021 0.5  0.0 - 1.2 mg/dL Final   • eGFR Non African Amer 03/10/2021 108  >60 mL/min/1.73 Final   • Globulin 03/10/2021 2.0  gm/dL Final   • A/G Ratio 03/10/2021 2.2  g/dL Final   • BUN/Creatinine Ratio 03/10/2021 14.9  7.0 - 25.0 Final   • Anion Gap 03/10/2021 12.0  5.0 - 15.0 mmol/L Final   • TSH 03/10/2021 4.760* 0.270 - 4.200 uIU/mL Final   • WBC 03/10/2021 5.91  3.40 - 10.80 10*3/mm3 Final   • RBC 03/10/2021 4.92  4.14 - 5.80 10*6/mm3 Final   • Hemoglobin 03/10/2021 14.7  13.0 - 17.7 g/dL Final   • Hematocrit 03/10/2021 43.7  37.5 - 51.0 % Final   • MCV 03/10/2021 88.8  79.0 - 97.0 fL Final   • MCH 03/10/2021 29.9  26.6 - 33.0 pg Final   • MCHC 03/10/2021 33.6  31.5 - 35.7 g/dL Final   • RDW 03/10/2021 13.9  12.3 - 15.4 % Final   • RDW-SD 03/10/2021 44.8  37.0 - 54.0 fl Final   • MPV 03/10/2021 9.3  6.0 - 12.0 fL Final   • Platelets 03/10/2021 280  140 - 450 10*3/mm3 Final      No radiology results for the last 90 days.     Assessment / Plan      1. Intractable vomiting with nausea, unspecified vomiting type  2. Diarrhea, unspecified type  3. Gastroesophageal  "reflux disease, unspecified whether esophagitis present  For the past 1-2 years he has been having vomiting after eating, worse now and reports having diarrhea even without eating.  Has 6-10 loose stools which are usually large in amount and in a \"pile\" in the toilet. No skip days between bowel movements. He has been taking ibuprofen as needed due to knee pain. No abdominal pain other than mild and intermittent generalized pain before a bowel movement. Denies having any heartburn but sometimes has reflux into his mouth, wakes in the morning with it. He has been taking protonix 40 mg once daily for 1-2 years but feels that it does not help much with reflux symptoms.     Based on history, he likely has irritable bowel syndrome with diarrhea. Labs 3/2021 showed normal CBC, ALT mildly elevated on CMP, TSH elevated at 4.760 and no history of thyroid disease or treatment.   Will have labs today.  Acid reflux measures discussed. Anti-reflux diet given.   Avoid NSAIDs.   Stop drinking soda, drink water instead.   Cut back on smoking cigarettes. Avoid alcohol for now.   Start omeprazole 40 mg once daily plus pepcid 40 mg BID for treatment of GERD which may be contributing to his persistent vomiting.   Will complete stool testing for evaluation of diarrhea, may need endoscopic procedures arranged at next visit depending on findings.   No recent abdominal imaging, I have asked him to get US abd for evaluation for possible gallbladder disease.     - omeprazole (priLOSEC) 40 MG capsule; Take 1 capsule by mouth Daily.  Dispense: 30 capsule; Refill: 5  - famotidine (PEPCID) 40 MG tablet; Take 1 tablet by mouth 2 (Two) Times a Day.  Dispense: 60 tablet; Refill: 5    - Comprehensive Metabolic Panel; Future  - CBC Auto Differential; Future  - IgA; Future  - TSH; Future  - Tissue Transglutaminase, IgA; Future  - T4, Free; Future  - US Abdomen Complete; Future  - Calprotectin, Fecal - Stool, Per Rectum; Future  - Gastrointestinal " Panel, PCR - Stool, Per Rectum; Future  - Pancreatic Elastase, Fecal - Stool, Per Rectum; Future  - Clostridium Difficile Toxin, PCR - Stool, Per Rectum; Future    4. Hyperlipidemia, unspecified hyperlipidemia type  5. Elevated ALT  He is at risk for development of fatty liver disease. BMI is 40. He has a history of elevated cholesterol, not on any treatment. He had mildly elevated ALT on labs in 3/2021. Will re-check CMP today. He needs to work on diet modification and weight loss. He will stop drinking soda. Weight loss goal for the next 6 months is to lose 30 lbs. He will follow the anti-reflux diet for now but long term it will be recommended for him to follow the mediterranean diet. US abd ordered to evaluate.         Follow Up:   Return in about 6 weeks (around 3/15/2022) for recheck diarrhea and vomiting, discuss results.      Darcie Monae PA-C  [facility]  2/1/2022  17:09 EST    Please note that portions of this note may have been completed with a voice recognition program. Efforts were made to edit the dictations, but occasionally words are mistranscribed.

## 2022-02-04 ENCOUNTER — LAB (OUTPATIENT)
Dept: LAB | Facility: HOSPITAL | Age: 27
End: 2022-02-04

## 2022-02-04 PROCEDURE — 83993 ASSAY FOR CALPROTECTIN FECAL: CPT | Performed by: PHYSICIAN ASSISTANT

## 2022-02-04 PROCEDURE — 84443 ASSAY THYROID STIM HORMONE: CPT | Performed by: PHYSICIAN ASSISTANT

## 2022-02-04 PROCEDURE — 82784 ASSAY IGA/IGD/IGG/IGM EACH: CPT | Performed by: PHYSICIAN ASSISTANT

## 2022-02-04 PROCEDURE — 86364 TISS TRNSGLTMNASE EA IG CLAS: CPT | Performed by: PHYSICIAN ASSISTANT

## 2022-02-04 PROCEDURE — 85025 COMPLETE CBC W/AUTO DIFF WBC: CPT | Performed by: PHYSICIAN ASSISTANT

## 2022-02-04 PROCEDURE — 80053 COMPREHEN METABOLIC PANEL: CPT | Performed by: PHYSICIAN ASSISTANT

## 2022-02-04 PROCEDURE — 84439 ASSAY OF FREE THYROXINE: CPT | Performed by: PHYSICIAN ASSISTANT

## 2022-02-04 PROCEDURE — 87493 C DIFF AMPLIFIED PROBE: CPT | Performed by: PHYSICIAN ASSISTANT

## 2022-02-04 PROCEDURE — 82653 EL-1 FECAL QUANTITATIVE: CPT | Performed by: PHYSICIAN ASSISTANT

## 2022-02-04 PROCEDURE — 0097U HC BIOFIRE FILMARRAY GI PANEL: CPT | Performed by: PHYSICIAN ASSISTANT

## 2022-02-08 ENCOUNTER — TELEPHONE (OUTPATIENT)
Dept: GASTROENTEROLOGY | Facility: CLINIC | Age: 27
End: 2022-02-08

## 2022-02-08 DIAGNOSIS — R19.7 DIARRHEA, UNSPECIFIED TYPE: Primary | ICD-10-CM

## 2022-02-08 RX ORDER — MONTELUKAST SODIUM 4 MG/1
1 TABLET, CHEWABLE ORAL 2 TIMES DAILY PRN
Qty: 60 TABLET | Refills: 2 | Status: SHIPPED | OUTPATIENT
Start: 2022-02-08 | End: 2022-04-05

## 2022-02-09 NOTE — TELEPHONE ENCOUNTER
Please let pt know that I have sent colestipol to take up to twice daily as needed for relief of diarrhea. Stool tests all negative so far except norovirus on his GI panel which should be self limiting and resolve on it's own

## 2022-02-10 ENCOUNTER — HOSPITAL ENCOUNTER (EMERGENCY)
Dept: HOSPITAL 79 - ER1 | Age: 27
Discharge: HOME | End: 2022-02-10
Payer: COMMERCIAL

## 2022-02-10 DIAGNOSIS — L05.91: Primary | ICD-10-CM

## 2022-02-10 DIAGNOSIS — Z79.899: ICD-10-CM

## 2022-02-14 ENCOUNTER — OFFICE VISIT (OUTPATIENT)
Dept: PSYCHIATRY | Facility: CLINIC | Age: 27
End: 2022-02-14

## 2022-02-14 VITALS
DIASTOLIC BLOOD PRESSURE: 88 MMHG | BODY MASS INDEX: 39.63 KG/M2 | HEIGHT: 73 IN | HEART RATE: 86 BPM | SYSTOLIC BLOOD PRESSURE: 136 MMHG | WEIGHT: 299 LBS

## 2022-02-14 DIAGNOSIS — F41.1 GENERALIZED ANXIETY DISORDER: Chronic | ICD-10-CM

## 2022-02-14 DIAGNOSIS — F33.2 SEVERE EPISODE OF RECURRENT MAJOR DEPRESSIVE DISORDER, WITHOUT PSYCHOTIC FEATURES: Primary | Chronic | ICD-10-CM

## 2022-02-14 DIAGNOSIS — F63.81 INTERMITTENT EXPLOSIVE DISORDER IN ADULT: ICD-10-CM

## 2022-02-14 PROCEDURE — 90792 PSYCH DIAG EVAL W/MED SRVCS: CPT | Performed by: NURSE PRACTITIONER

## 2022-02-14 RX ORDER — SULFAMETHOXAZOLE AND TRIMETHOPRIM 800; 160 MG/1; MG/1
2 TABLET ORAL 2 TIMES DAILY
COMMUNITY
Start: 2022-02-11 | End: 2022-04-14

## 2022-02-14 RX ORDER — ARIPIPRAZOLE 10 MG/1
10 TABLET ORAL DAILY
Qty: 30 TABLET | Refills: 2 | Status: SHIPPED | OUTPATIENT
Start: 2022-02-14 | End: 2022-04-18

## 2022-02-14 RX ORDER — ARIPIPRAZOLE 5 MG/1
5 TABLET ORAL DAILY
Qty: 7 TABLET | Refills: 0 | Status: SHIPPED | OUTPATIENT
Start: 2022-02-14 | End: 2022-03-15

## 2022-02-14 RX ORDER — SERTRALINE HYDROCHLORIDE 25 MG/1
25 TABLET, FILM COATED ORAL DAILY
Qty: 7 TABLET | Refills: 0 | Status: SHIPPED | OUTPATIENT
Start: 2022-02-14 | End: 2022-03-15

## 2022-02-14 RX ORDER — DESVENLAFAXINE SUCCINATE 50 MG/1
50 TABLET, EXTENDED RELEASE ORAL DAILY
Qty: 10 TABLET | Refills: 0 | Status: SHIPPED | OUTPATIENT
Start: 2022-02-14 | End: 2022-03-15

## 2022-02-14 NOTE — PROGRESS NOTES
Chief Complaint  No chief complaint on file.    Subjective     {CC  Encounters  Notes :23}     Luis Darby presents to [facility] for ***    History of Present Illness: ***    Current Medications:   Current Outpatient Medications   Medication Sig Dispense Refill   • buPROPion XL (WELLBUTRIN XL) 300 MG 24 hr tablet      • colestipol (COLESTID) 1 g tablet Take 1 tablet by mouth 2 (Two) Times a Day As Needed (diarrhea). 60 tablet 2   • desvenlafaxine (PRISTIQ) 50 MG 24 hr tablet Take 50 mg by mouth Daily.     • ibuprofen (ADVIL,MOTRIN) 800 MG tablet TAKE 1 TABLET BY MOUTH THREE TIMES DAILY FOR 30 DAYS WITH FOOD OR MILK     • omeprazole (priLOSEC) 40 MG capsule Take 1 capsule by mouth Daily. 30 capsule 5   • OXcarbazepine (TRILEPTAL) 150 MG tablet Take 150 mg by mouth 2 (Two) Times a Day.     • triamcinolone (KENALOG) 0.1 % ointment Apply 1 application topically to the appropriate area as directed 2 (Two) Times a Day.     • Vraylar 4.5 MG capsule Take 4.5 mg by mouth Daily.     • sulfamethoxazole-trimethoprim (BACTRIM DS,SEPTRA DS) 800-160 MG per tablet Take 2 tablets by mouth 2 (Two) Times a Day.       No current facility-administered medications for this visit.       Mental Status Exam:   Hygiene:   {good/fair/poor:734911678}  Cooperation:  {Cooperation:120776805}  Eye Contact:  {Eye Contact:900893658}  Psychomotor Behavior:  {Psychomotor Behavior:34478}  Affect:  {Affect:266015820}  Mood: {Mood:28588}  Speech:  {Speech:947019304}  Thought Process:  {Thought Process:542619753}  Thought Content:  {Thought Content:030049894}  Suicidal:  {Suicidal:530141748}  Homicidal:  {Homidical:435214558}  Hallucinations:  {Hallucinations:836519018}  Delusion:  {Delusion:711289276}  Memory:  {Memory:695066037}  Orientation:  {Orientation:958737776}  Reliability:  {good/fair/poor:477842527}  Insight:  {good/fair/poor/none:388668866}  Judgement:  {good/fair/poor/impaired:477218394}  Impulse  "Control:  {good/fair/poor/impaired:130874171}  Physical/Medical Issues:  {No/Yes:741414644}       Objective   Vital Signs:   /88   Pulse 86   Ht 185.4 cm (73\")   Wt 136 kg (299 lb)   BMI 39.45 kg/m²     Physical Exam   Result Review :{ Labs  Med Tab  Media :23}     {The following data was reviewed by (Optional):56608}  {Ambulatory Labs (Optional):52279}  {Data reviewed (Optional):70352:::1}          Assessment and Plan { Review (Popup)  Quality  BestPractice :23}   Problem List Items Addressed This Visit     None          PHQ-9 Score:   PHQ-9 Total Score: 24    Depression Screening:  Patient screened positive for depression based on a PHQ-9 score of 24 on 2/14/2022. Follow-up recommendations include: {depression follow-up plan:48593}.      Tobacco Cessation:  ***    Impression/Plan:  ***    MEDS ORDERED DURING VISIT:  No orders of the defined types were placed in this encounter.      {Time Spent (Optional):53389}  Follow Up { Follow-up  Notes :23}  No follow-ups on file.  Patient was given instructions and counseling regarding his condition or for health maintenance advice. Please see specific information pulled into the AVS if appropriate.       TREATMENT PLAN/GOALS: Continue supportive psychotherapy efforts and medications as indicated. Treatment and medication options discussed during today's visit. Patient acknowledged and verbally consented to continue with current treatment plan and was educated on the importance of compliance with treatment and follow-up appointments.    MEDICATION ISSUES:  Discussed medication options and treatment plan of prescribed medication as well as the risks, benefits, and side effects including potential falls, possible impaired driving and metabolic adversities among others. Patient is agreeable to call the office with any worsening of symptoms or onset of side effects. Patient is agreeable to call 911 or go to the nearest ER should he/she begin having " SI/HI.          This document has been electronically signed by CLAUDIA Rogers, PMHNP-BC  February 14, 2022 13:28 EST      Part of this note may be an electronic transcription/translation of spoken language to printed text using the Dragon Dictation System.
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
## 2022-02-22 ENCOUNTER — APPOINTMENT (OUTPATIENT)
Dept: ULTRASOUND IMAGING | Facility: HOSPITAL | Age: 27
End: 2022-02-22

## 2022-03-15 ENCOUNTER — OFFICE VISIT (OUTPATIENT)
Dept: PSYCHIATRY | Facility: CLINIC | Age: 27
End: 2022-03-15

## 2022-03-15 VITALS — BODY MASS INDEX: 39.55 KG/M2 | WEIGHT: 292 LBS | HEIGHT: 72 IN

## 2022-03-15 DIAGNOSIS — F41.1 GENERALIZED ANXIETY DISORDER: Chronic | ICD-10-CM

## 2022-03-15 DIAGNOSIS — F33.2 SEVERE EPISODE OF RECURRENT MAJOR DEPRESSIVE DISORDER, WITHOUT PSYCHOTIC FEATURES: Chronic | ICD-10-CM

## 2022-03-15 DIAGNOSIS — F63.81 INTERMITTENT EXPLOSIVE DISORDER IN ADULT: Primary | ICD-10-CM

## 2022-03-15 PROCEDURE — 99214 OFFICE O/P EST MOD 30 MIN: CPT | Performed by: NURSE PRACTITIONER

## 2022-03-15 RX ORDER — OXCARBAZEPINE 300 MG/1
300 TABLET, FILM COATED ORAL 2 TIMES DAILY
Qty: 60 TABLET | Refills: 2 | Status: SHIPPED | OUTPATIENT
Start: 2022-03-15 | End: 2022-06-23 | Stop reason: SDDI

## 2022-03-15 RX ORDER — BUPROPION HYDROCHLORIDE 300 MG/1
300 TABLET ORAL EVERY MORNING
Qty: 30 TABLET | Refills: 2 | Status: SHIPPED | OUTPATIENT
Start: 2022-03-15 | End: 2022-05-12

## 2022-03-15 RX ORDER — SERTRALINE HYDROCHLORIDE 100 MG/1
100 TABLET, FILM COATED ORAL DAILY
Qty: 30 TABLET | Refills: 2 | Status: SHIPPED | OUTPATIENT
Start: 2022-03-15 | End: 2022-06-02

## 2022-03-15 NOTE — PROGRESS NOTES
"Chief Complaint  Anxiety, Depression, and Agitation    Subjective          Luis Darby presents to [facility] for medication management of his anxiety, depression, agitation/aggressive outbursts.    History of Present Illness: Patient presents today for follow-up appointment after last being seen for initial evaluation on 02/14/2022.  He is accompanied the appointment again by his wife.  Patient says \"I am just working like a dog\".  He reports he is struggling a lot still with his anxiety and some with his mood.  Patient says \"I does worry about money and my mom's health a lot\".  Patient's wife reports today that no longer moving to [place] \"after he changed his mind, and put the kibosh on it\".  They were initially scheduled to move to [place] for his work, and training.  However patient has decided that he likes his current job, and does not want to move.  This caused a lot of difficulty with his mother, who was looking forward to moving and did not take the news they would not be going well.  At his last appointment, the patient was transitioned from Pristiq to Zoloft, and from Vraylar to Abilify.  Patient reports that this transition went without issue.  Patient reports he is still struggling with sleep, and his wife continues to be very concerned regarding his sleep apnea.  He has not yet been able to see neurology.  Patient denies any SI/HI, A/V hallucinations.    Current Medications:   Current Outpatient Medications   Medication Sig Dispense Refill   • ARIPiprazole (Abilify) 10 MG tablet Take 1 tablet by mouth Daily. 30 tablet 2   • buPROPion XL (WELLBUTRIN XL) 300 MG 24 hr tablet Take 1 tablet by mouth Every Morning. 30 tablet 2   • OXcarbazepine (TRILEPTAL) 300 MG tablet Take 1 tablet by mouth 2 (Two) Times a Day. 60 tablet 2   • sertraline (Zoloft) 100 MG tablet Take 1 tablet by mouth Daily. 30 tablet 2   • colestipol (COLESTID) 1 g tablet Take 1 tablet by mouth 2 " "(Two) Times a Day As Needed (diarrhea). 60 tablet 2   • ibuprofen (ADVIL,MOTRIN) 800 MG tablet TAKE 1 TABLET BY MOUTH THREE TIMES DAILY FOR 30 DAYS WITH FOOD OR MILK     • omeprazole (priLOSEC) 40 MG capsule Take 1 capsule by mouth Daily. 30 capsule 5   • sulfamethoxazole-trimethoprim (BACTRIM DS,SEPTRA DS) 800-160 MG per tablet Take 2 tablets by mouth 2 (Two) Times a Day.     • triamcinolone (KENALOG) 0.1 % ointment Apply 1 application topically to the appropriate area as directed 2 (Two) Times a Day.       No current facility-administered medications for this visit.       Mental Status Exam:   Hygiene:   fair  Cooperation:  Cooperative  Eye Contact:  Good  Psychomotor Behavior:  Restless  Affect:  Appropriate  Mood: anxious, irritable and fluctates  Speech:  Normal  Thought Process:  Goal directed  Thought Content:  Mood congruent  Suicidal:  None  Homicidal:  None  Hallucinations:  None  Delusion:  None  Memory:  Intact  Orientation:  Person, Place, Time and Situation  Reliability:  good  Insight:  Fair  Judgement:  Fair  Impulse Control:  Fair  Physical/Medical Issues:  GERD, TIESHA       Objective   Vital Signs:   Ht 182.9 cm (72\")   Wt 132 kg (292 lb)   BMI 39.60 kg/m²     Physical Exam  Neurological:      Mental Status: He is oriented to person, place, and time. Mental status is at baseline.      Coordination: Coordination is intact.      Gait: Gait is intact.   Psychiatric:         Behavior: Behavior is cooperative.        Result Review :     The following data was reviewed by: CLAUDIA Thomas on 03/15/2022:    Data reviewed: Previous note, medication history          Assessment and Plan    Problem List Items Addressed This Visit    None     Visit Diagnoses     Intermittent explosive disorder in adult    -  Primary    Relevant Medications    sertraline (Zoloft) 100 MG tablet    OXcarbazepine (TRILEPTAL) 300 MG tablet    buPROPion XL (WELLBUTRIN XL) 300 MG 24 hr tablet    Severe episode of recurrent major " depressive disorder, without psychotic features (HCC)  (Chronic)       Relevant Medications    sertraline (Zoloft) 100 MG tablet    OXcarbazepine (TRILEPTAL) 300 MG tablet    buPROPion XL (WELLBUTRIN XL) 300 MG 24 hr tablet    Generalized anxiety disorder  (Chronic)       Relevant Medications    sertraline (Zoloft) 100 MG tablet    buPROPion XL (WELLBUTRIN XL) 300 MG 24 hr tablet          PHQ-9 Score:   PHQ-9 Total Score: 16    Depression Screening:  Patient screened positive for depression based on a PHQ-9 score of 16 on 3/15/2022. Follow-up recommendations include: Prescribed antidepressant medication treatment and Suicide Risk Assessment performed.        Tobacco Cessation:  Luis Darby  reports that he has been smoking. He has been smoking about 2.00 packs per day. He does not have any smokeless tobacco history on file.. I have educated him on the risk of diseases from using tobacco products such as cancer, COPD and heart disease.     I advised him to quit and he is not willing to quit.    I spent 3  minutes counseling the patient.           Impression/Plan:  -This is my first follow-up appoint with patient.  Patient presents today and reports the medication changes from her last appointment went well, but that he is still struggling with mood, anxiety, and irritability.  Patient is also still not sleeping, but has not yet been able to see sleep medicine.  His wife endorses a lot of concern regarding his sleep.  Advised them we would provide him with the phone number to sleep medicine, as well as ensure the referral has been placed properly.  -Increase Zoloft to 100 mg daily.  -Increase Trileptal to 300 mg twice daily.  -Maintain Wellbutrin  mg daily.  -Maintain Abilify 10 mg daily.  -Schedule follow-up for 1 month or as needed.    MEDS ORDERED DURING VISIT:  New Medications Ordered This Visit   Medications   • sertraline (Zoloft) 100 MG tablet     Sig: Take 1 tablet by mouth Daily.     Dispense:  30  tablet     Refill:  2   • OXcarbazepine (TRILEPTAL) 300 MG tablet     Sig: Take 1 tablet by mouth 2 (Two) Times a Day.     Dispense:  60 tablet     Refill:  2   • buPROPion XL (WELLBUTRIN XL) 300 MG 24 hr tablet     Sig: Take 1 tablet by mouth Every Morning.     Dispense:  30 tablet     Refill:  2         Follow Up   Return in about 1 month (around 4/15/2022), or if symptoms worsen or fail to improve, for Next scheduled follow up.  Patient was given instructions and counseling regarding his condition or for health maintenance advice. Please see specific information pulled into the AVS if appropriate.       TREATMENT PLAN/GOALS: Continue supportive psychotherapy efforts and medications as indicated. Treatment and medication options discussed during today's visit. Patient acknowledged and verbally consented to continue with current treatment plan and was educated on the importance of compliance with treatment and follow-up appointments.    MEDICATION ISSUES:  Discussed medication options and treatment plan of prescribed medication as well as the risks, benefits, and side effects including potential falls, possible impaired driving and metabolic adversities among others. Patient is agreeable to call the office with any worsening of symptoms or onset of side effects. Patient is agreeable to call 911 or go to the nearest ER should he/she begin having SI/HI.          This document has been electronically signed by CLAUDIA Rogers, PMHNP-BC  March 16, 2022 08:02 EDT      Part of this note may be an electronic transcription/translation of spoken language to printed text using the Dragon Dictation System.

## 2022-04-05 ENCOUNTER — OFFICE VISIT (OUTPATIENT)
Dept: NEUROLOGY | Facility: CLINIC | Age: 27
End: 2022-04-05

## 2022-04-05 VITALS
DIASTOLIC BLOOD PRESSURE: 80 MMHG | OXYGEN SATURATION: 97 % | WEIGHT: 292.2 LBS | BODY MASS INDEX: 38.73 KG/M2 | HEART RATE: 85 BPM | TEMPERATURE: 97.1 F | HEIGHT: 73 IN | SYSTOLIC BLOOD PRESSURE: 140 MMHG

## 2022-04-05 DIAGNOSIS — Z20.822 ENCOUNTER FOR PREOPERATIVE SCREENING LABORATORY TESTING FOR COVID-19 VIRUS: ICD-10-CM

## 2022-04-05 DIAGNOSIS — R06.83 SNORING: ICD-10-CM

## 2022-04-05 DIAGNOSIS — F39 MOOD DISORDER: ICD-10-CM

## 2022-04-05 DIAGNOSIS — G47.33 OBSTRUCTIVE SLEEP APNEA: Primary | ICD-10-CM

## 2022-04-05 DIAGNOSIS — Z01.812 ENCOUNTER FOR PREOPERATIVE SCREENING LABORATORY TESTING FOR COVID-19 VIRUS: ICD-10-CM

## 2022-04-05 PROCEDURE — 99213 OFFICE O/P EST LOW 20 MIN: CPT | Performed by: NURSE PRACTITIONER

## 2022-04-05 NOTE — PROGRESS NOTES
New Sleep Patient Office Visit      Patient Name: Luis Darby  : 1995   MRN: [id number]     Referring Physician: Susannah Hemphill A*    Chief Complaint:    Chief Complaint   Patient presents with   • Consult     NP, in office to establish care for obstructive sleep apnea. Patient c/o snoring, gasping for air.           History of Present Illness: Luis Darby is a 26 y.o. male who is here today to establish care with Sleep Medicine.  He has a history of TIESHA and was on PAP therapy for a while then stopped it.  He had difficulty tolerating the pressures.  Sleep questionnaire reviewed- he snores, he is not getting good sleep, he wakes up frequently during sleep, he sleeps about 4-6 hours per night, he had daytime sleepiness, he takes naps 4-6 days per week, he wakes up gasping and choking, he has been told he stops breathing during sleep, he wakes up with a dry mouth, he has leg kicking while asleep, he has leg cramps at night, he has nightmares, he talks in his sleep, he acts out his dreams, he has sleep attacks during the day, he has pain that interferes with sleep.  Additional risk factors- BMI 38, mood disorder, GERD, cigarette smoker.   *He was told he would have to have another test to get back on PAP therapy.     [place] Score: 17    Subjective      Review of Systems:   Review of Systems   Constitutional: Positive for fatigue. Negative for fever, unexpected weight gain and unexpected weight loss.   HENT: Negative for hearing loss, sore throat, swollen glands, tinnitus and trouble swallowing.    Eyes: Negative for blurred vision, double vision, photophobia and visual disturbance.   Respiratory: Positive for cough, choking, chest tightness, shortness of breath and wheezing.    Cardiovascular: Negative for chest pain, palpitations and leg swelling.   Gastrointestinal: Negative for constipation, diarrhea and nausea.   Endocrine: Negative for cold intolerance and heat intolerance.    Musculoskeletal: Negative for gait problem, neck pain and neck stiffness.   Skin: Negative for color change and rash.   Allergic/Immunologic: Negative for environmental allergies and food allergies.   Neurological: Negative for dizziness, syncope, facial asymmetry, speech difficulty, weakness, headache, memory problem and confusion.   Psychiatric/Behavioral: Negative for agitation, behavioral problems and depressed mood. The patient is not nervous/anxious.        Past Medical History:   Past Medical History:   Diagnosis Date   • ADHD    • Anxiety    • Depression    • GERD (gastroesophageal reflux disease)    • Hypersomnolence    • Intermittent explosive disorder in adult    • Mood disorder (HCC)    • Patellofemoral stress syndrome        Past Surgical History:   Past Surgical History:   Procedure Laterality Date   • INCISION AND DRAINAGE ABSCESS  06/2021       Family History:   Family History   Problem Relation Age of Onset   • Cancer Mother        Social History:   Social History     Socioeconomic History   • Marital status:    Tobacco Use   • Smoking status: Current Every Day Smoker     Packs/day: 2.00   • Smokeless tobacco: Current User   Vaping Use   • Vaping Use: Never used   Substance and Sexual Activity   • Alcohol use: Yes     Comment: occasionally   • Drug use: Never   • Sexual activity: Defer       Medications:     Current Outpatient Medications:   •  ARIPiprazole (Abilify) 10 MG tablet, Take 1 tablet by mouth Daily., Disp: 30 tablet, Rfl: 2  •  buPROPion XL (WELLBUTRIN XL) 300 MG 24 hr tablet, Take 1 tablet by mouth Every Morning., Disp: 30 tablet, Rfl: 2  •  ibuprofen (ADVIL,MOTRIN) 800 MG tablet, TAKE 1 TABLET BY MOUTH THREE TIMES DAILY FOR 30 DAYS WITH FOOD OR MILK, Disp: , Rfl:   •  omeprazole (priLOSEC) 40 MG capsule, Take 1 capsule by mouth Daily., Disp: 30 capsule, Rfl: 5  •  OXcarbazepine (TRILEPTAL) 300 MG tablet, Take 1 tablet by mouth 2 (Two) Times a Day., Disp: 60 tablet, Rfl: 2  •   "sertraline (Zoloft) 100 MG tablet, Take 1 tablet by mouth Daily., Disp: 30 tablet, Rfl: 2  •  sulfamethoxazole-trimethoprim (BACTRIM DS,SEPTRA DS) 800-160 MG per tablet, Take 2 tablets by mouth 2 (Two) Times a Day., Disp: , Rfl:     Allergies:   No Known Allergies    Objective     Physical Exam:  Vital Signs:   Vitals:    04/05/22 1447   BP: 140/80   BP Location: Right arm   Patient Position: Sitting   Cuff Size: Adult   Pulse: 85   Temp: 97.1 °F (36.2 °C)   SpO2: 97%   Weight: 133 kg (292 lb 3.2 oz)   Height: 185.4 cm (73\")   PainSc:   8   PainLoc: Knee     BMI: Body mass index is 38.55 kg/m².  Neck Circumference: 18 7/8    Physical Exam  Vitals and nursing note reviewed.   Constitutional:       General: He is not in acute distress.     Appearance: He is well-developed. He is obese. He is not diaphoretic.   HENT:      Head: Normocephalic and atraumatic.      Comments: Mallampati 4  Eyes:      Extraocular Movements: Extraocular movements intact.      Conjunctiva/sclera: Conjunctivae normal.   Neck:      Trachea: Trachea normal.   Cardiovascular:      Rate and Rhythm: Normal rate and regular rhythm.      Heart sounds: Normal heart sounds. No murmur heard.    No friction rub. No gallop.   Pulmonary:      Effort: Pulmonary effort is normal. No respiratory distress.      Breath sounds: Normal breath sounds. No wheezing or rales.   Musculoskeletal:         General: Normal range of motion.   Skin:     General: Skin is warm and dry.      Findings: No rash.   Neurological:      Mental Status: He is alert and oriented to person, place, and time.   Psychiatric:         Mood and Affect: Mood normal.         Behavior: Behavior normal.         Thought Content: Thought content normal.         Judgment: Judgment normal.         Assessment / Plan      Assessment/Plan:   Diagnoses and all orders for this visit:    1. Obstructive sleep apnea (Primary)  -     Polysomnography 4 or More Parameters With CPAP; Future    2. Snoring  -     " Polysomnography 4 or More Parameters With CPAP; Future    3. Mood disorder (HCC)  -     Polysomnography 4 or More Parameters With CPAP; Future    4. BMI 38.0-38.9,adult  -     Polysomnography 4 or More Parameters With CPAP; Future    5. Encounter for preoperative screening laboratory testing for COVID-19 virus  -     COVID PRE-OP / PRE-PROCEDURE SCREENING ORDER (NO ISOLATION) - Swab, Nasopharynx; Future    *Education on TIESHA provided today.   *Advised patient to avoid driving or operating heavy or dangerous equipment if drowsy.   *Previous sleep study requested (from [facility]) for review.    *Encouraged weight loss with a BMI of 24.     Follow Up:   Return in about 12 weeks (around 6/28/2022) for F/U Obstructive Sleep Apnea.    I have advised the patient the need to continue the use of CPAP.  Gold standard for treatment of sleep apnea includes weight loss, use of cpap and avoidance of alcohol.  Untreated TIESHA may increase the risk for development of hypertension, stroke, myocardial infarction, diabetes, cardiovascular disease, work-related issues and driving accidents. I have counseled and advised the patient to avoid driving or operating heavy/dangerous equipment if feeling drowsy.     CLAUDIA Stein, FNP-C  [facility]       Please note that portions of this note may have been completed with a voice recognition program. Efforts were made to edit the dictations, but occasionally words are mistranscribed.

## 2022-04-14 ENCOUNTER — OFFICE VISIT (OUTPATIENT)
Dept: PSYCHIATRY | Facility: CLINIC | Age: 27
End: 2022-04-14

## 2022-04-14 VITALS
SYSTOLIC BLOOD PRESSURE: 134 MMHG | DIASTOLIC BLOOD PRESSURE: 80 MMHG | HEIGHT: 73 IN | BODY MASS INDEX: 39.43 KG/M2 | HEART RATE: 82 BPM | WEIGHT: 297.5 LBS

## 2022-04-14 DIAGNOSIS — F90.2 ADHD (ATTENTION DEFICIT HYPERACTIVITY DISORDER), COMBINED TYPE: Primary | ICD-10-CM

## 2022-04-14 DIAGNOSIS — Z79.899 MEDICATION MANAGEMENT: ICD-10-CM

## 2022-04-14 DIAGNOSIS — F33.2 SEVERE EPISODE OF RECURRENT MAJOR DEPRESSIVE DISORDER, WITHOUT PSYCHOTIC FEATURES: Chronic | ICD-10-CM

## 2022-04-14 DIAGNOSIS — F41.1 GENERALIZED ANXIETY DISORDER: Chronic | ICD-10-CM

## 2022-04-14 DIAGNOSIS — F63.81 INTERMITTENT EXPLOSIVE DISORDER IN ADULT: ICD-10-CM

## 2022-04-14 PROCEDURE — 99214 OFFICE O/P EST MOD 30 MIN: CPT | Performed by: NURSE PRACTITIONER

## 2022-04-14 NOTE — PROGRESS NOTES
"Chief Complaint  ADHD, Anxiety, Depression, and IED    Subjective          Luis Darby presents to [facility] for medication management of his ADHD, anxiety, depression.    History of Present Illness: Patient presents today for follow-up appointment after last being seen on 03/15/2022.  He had his initial appointment with sleep medicine on 04/05/2022, and has a sleep study scheduled for 04/22/2022.  He is looking forward to this because his sleep is continued to be a massive struggle for him.  Patient says today \"I am not doing too well\".  He is accompanied again by his wife who says \"if anything he has been worse than he was before\".  She says he has 0 patients, and \"loses his crap way too easy\".  He has been taking his medication, but is unsure how significantly they are helping.  When the patient was younger he was diagnosed with ADHD.  He was treated with Adderall, but only for approximately 1 month.  He says he lost a significant amount of weight during that month, so his mother stopped the medication.  Patient is extremely fidgety and says \"I just cannot stop moving\".  He has extremely poor focus, to the point that he has been unable to maintain employment because he cannot focus on what he is doing.  He has very little patience and is extremely impulsive.  His wife also reports he is extremely difficult to wake up in the morning, saying it often takes approximately 2 hours to wake him up every day.  Patient denies any new or significant issues with appetite.  Patient denies any SI/HI, A/V hallucinations.    Current Medications:   Current Outpatient Medications   Medication Sig Dispense Refill   • ARIPiprazole (Abilify) 10 MG tablet Take 1 tablet by mouth Daily. 30 tablet 2   • buPROPion XL (WELLBUTRIN XL) 300 MG 24 hr tablet Take 1 tablet by mouth Every Morning. 30 tablet 2   • ibuprofen (ADVIL,MOTRIN) 800 MG tablet TAKE 1 TABLET BY MOUTH THREE TIMES DAILY FOR 30 DAYS " "WITH FOOD OR MILK     • omeprazole (priLOSEC) 40 MG capsule Take 1 capsule by mouth Daily. 30 capsule 5   • OXcarbazepine (TRILEPTAL) 300 MG tablet Take 1 tablet by mouth 2 (Two) Times a Day. 60 tablet 2   • sertraline (Zoloft) 100 MG tablet Take 1 tablet by mouth Daily. 30 tablet 2     No current facility-administered medications for this visit.       Mental Status Exam:   Hygiene:   fair  Cooperation:  Cooperative  Eye Contact:  Good  Psychomotor Behavior:  Hyperactive  Affect:  Appropriate  Mood: anxious, irritable and fluctates  Speech:  Rapid and Rambling  Thought Process:  Goal directed  Thought Content:  Mood congruent  Suicidal:  None  Homicidal:  None  Hallucinations:  None  Delusion:  None  Memory:  Intact  Orientation:  Person, Place, Time and Situation  Reliability:  good  Insight:  Good  Judgement:  Fair  Impulse Control:  Fair  Physical/Medical Issues:  GERD, TIESHA       Objective   Vital Signs:   /80   Pulse 82   Ht 185.4 cm (73\")   Wt 135 kg (297 lb 8 oz)   BMI 39.25 kg/m²     Physical Exam  Neurological:      Mental Status: He is oriented to person, place, and time. Mental status is at baseline.      Coordination: Coordination is intact.      Gait: Gait is intact.   Psychiatric:         Behavior: Behavior is cooperative.        Result Review :     The following data was reviewed by: CLAUDIA Thomas on 04/14/2022:    Data reviewed: Previous note, medication history          Assessment and Plan    Problem List Items Addressed This Visit    None     Visit Diagnoses     ADHD (attention deficit hyperactivity disorder), combined type    -  Primary    Medication management        Relevant Orders    Compliance Drug Analysis, Ur - Urine, Clean Catch    Severe episode of recurrent major depressive disorder, without psychotic features (HCC)  (Chronic)       Generalized anxiety disorder  (Chronic)       Intermittent explosive disorder in adult              PHQ-9 Score:   PHQ-9 Total Score: " 22      Depression Screening:  Patient screened positive for depression based on a PHQ-9 score of 22 on 4/14/2022. Follow-up recommendations include: Prescribed antidepressant medication treatment and Suicide Risk Assessment performed.        Tobacco Cessation:  Luis Darby  reports that he has been smoking. He has been smoking about 1.00 pack per day. He uses smokeless tobacco.. I have educated him on the risk of diseases from using tobacco products such as cancer, COPD and heart disease.     I advised him to quit and he is not willing to quit.    I spent 3  minutes counseling the patient.           Impression/Plan:  -This is a follow-up appointment.  Patient presents today and reports he has continued to struggle since his last appointment.  He is extremely fidgety and hyperactive throughout the appointment.  His speech is very fast, and he and his wife both complain he has continued to be very impulsive, has very little patience, and is struggling to even interact with his children.  Strongly recommended to the patient we try to treat his ADHD, and he reports he would be open to this.  Advised him we would have to perform a UDS first, he denies any substance use.  -UDS performed in office today.  -Once UDS returns, advised patient I would order Vyvanse 30 mg daily.  We discussed risks versus benefits, as well as potential adverse effects associated with adding this medication to patient's daily regimen. Patient is in agreement with this plan and was educated on the importance of compliance with all aspects of treatment and follow-up appointments. Patient is agreeable to call the office with any worsening of symptoms or onset of side effects.    -Maintain Wellbutrin  mg daily.  -Maintain Abilify 10 mg daily.  -Maintain Zoloft 100 mg daily.  -Maintain Trileptal 300 mg twice daily.  -If patient responds well to the Vyvanse, we will likely look at trying to remove other medications as needed.  -Schedule  follow-up for 5 weeks or as needed.      MEDS ORDERED DURING VISIT:  No orders of the defined types were placed in this encounter.        Follow Up   Return in about 5 weeks (around 5/19/2022), or if symptoms worsen or fail to improve, for Next scheduled follow up.  Patient was given instructions and counseling regarding his condition or for health maintenance advice. Please see specific information pulled into the AVS if appropriate.       TREATMENT PLAN/GOALS: Continue supportive psychotherapy efforts and medications as indicated. Treatment and medication options discussed during today's visit. Patient acknowledged and verbally consented to continue with current treatment plan and was educated on the importance of compliance with treatment and follow-up appointments.    MEDICATION ISSUES:  Discussed medication options and treatment plan of prescribed medication as well as the risks, benefits, and side effects including potential falls, possible impaired driving and metabolic adversities among others. Patient is agreeable to call the office with any worsening of symptoms or onset of side effects. Patient is agreeable to call 911 or go to the nearest ER should he/she begin having SI/HI.          This document has been electronically signed by CLAUDIA Rogers, PMHNP-BC  April 14, 2022 15:41 EDT      Part of this note may be an electronic transcription/translation of spoken language to printed text using the Dragon Dictation System.

## 2022-04-18 ENCOUNTER — TELEPHONE (OUTPATIENT)
Dept: PSYCHIATRY | Facility: CLINIC | Age: 27
End: 2022-04-18

## 2022-04-18 DIAGNOSIS — F90.2 ADHD (ATTENTION DEFICIT HYPERACTIVITY DISORDER), COMBINED TYPE: Primary | ICD-10-CM

## 2022-04-18 DIAGNOSIS — F63.81 INTERMITTENT EXPLOSIVE DISORDER IN ADULT: Primary | ICD-10-CM

## 2022-04-18 RX ORDER — RISPERIDONE 1 MG/1
1 TABLET ORAL 2 TIMES DAILY
Qty: 60 TABLET | Refills: 2 | Status: SHIPPED | OUTPATIENT
Start: 2022-04-18 | End: 2022-06-02

## 2022-04-18 NOTE — TELEPHONE ENCOUNTER
Pt called into let you know his medication are not working feels a lot of rage and hatred would like a call back.

## 2022-04-18 NOTE — TELEPHONE ENCOUNTER
Called and spoke with the patient.  Advised him we would switch him from Abilify to Risperdal.  He is going to take half of his Abilify dose for 1 week and then stop.  Starting him on Risperdal 1 mg twice daily.  Advised patient I was still waiting on his UDS results to return regarding initiation of Vyvanse for his ADHD.  Patient also inquired about possibly applying for at least temporary disability because he is unable to maintain work.  Advised him to contact them about the application process.  Advised patient I would be in contact after his UDS returns.  Risperdal 1 mg twice daily sent to [facility] in [place].

## 2022-04-20 ENCOUNTER — LAB (OUTPATIENT)
Dept: LAB | Facility: HOSPITAL | Age: 27
End: 2022-04-20

## 2022-04-20 DIAGNOSIS — Z01.812 ENCOUNTER FOR PREOPERATIVE SCREENING LABORATORY TESTING FOR COVID-19 VIRUS: ICD-10-CM

## 2022-04-20 DIAGNOSIS — Z20.822 ENCOUNTER FOR PREOPERATIVE SCREENING LABORATORY TESTING FOR COVID-19 VIRUS: ICD-10-CM

## 2022-04-20 LAB — DRUGS UR: NORMAL

## 2022-04-20 PROCEDURE — U0004 COV-19 TEST NON-CDC HGH THRU: HCPCS

## 2022-04-20 PROCEDURE — C9803 HOPD COVID-19 SPEC COLLECT: HCPCS

## 2022-04-21 LAB — SARS-COV-2 RNA PNL SPEC NAA+PROBE: NOT DETECTED

## 2022-04-22 ENCOUNTER — HOSPITAL ENCOUNTER (OUTPATIENT)
Dept: SLEEP MEDICINE | Facility: HOSPITAL | Age: 27
Discharge: HOME | End: 2022-04-22
Admitting: NURSE PRACTITIONER

## 2022-04-22 DIAGNOSIS — R06.83 SNORING: ICD-10-CM

## 2022-04-22 DIAGNOSIS — G47.33 OBSTRUCTIVE SLEEP APNEA: ICD-10-CM

## 2022-04-22 DIAGNOSIS — F39 MOOD DISORDER: ICD-10-CM

## 2022-04-22 PROCEDURE — 95811 POLYSOM 6/>YRS CPAP 4/> PARM: CPT | Performed by: INTERNAL MEDICINE

## 2022-04-22 PROCEDURE — 95811 POLYSOM 6/>YRS CPAP 4/> PARM: CPT

## 2022-04-25 NOTE — TELEPHONE ENCOUNTER
Spouse called in states the new medication is causing him to be itchy and feeling like things are crawling on him. Please advise.

## 2022-04-25 NOTE — TELEPHONE ENCOUNTER
Spoke with the patient. Since starting medication, has felt a sensation at night that something is crawling on him. He does feel the Risperdal has been somewhat beneficial for his mood and impulsiveness when compared to the Abilify. Advised him to maintain Risperdal at this time. Discussed that his UDS has returned without issue. Starting Vyvanse 30mg per discussion from last appt. Patient's JAYY report reviewed and deemed appropriate.

## 2022-05-02 ENCOUNTER — TELEPHONE (OUTPATIENT)
Dept: PSYCHIATRY | Facility: CLINIC | Age: 27
End: 2022-05-02

## 2022-05-02 DIAGNOSIS — F90.2 ADHD (ATTENTION DEFICIT HYPERACTIVITY DISORDER), COMBINED TYPE: Primary | Chronic | ICD-10-CM

## 2022-05-02 RX ORDER — METHYLPHENIDATE HYDROCHLORIDE 10 MG/1
10 TABLET ORAL 2 TIMES DAILY
Qty: 60 TABLET | Refills: 0 | Status: SHIPPED | OUTPATIENT
Start: 2022-05-02 | End: 2022-05-12

## 2022-05-02 NOTE — TELEPHONE ENCOUNTER
Luis states his medication is causing him to have mood swings states he is happy one minute and really mad the next for no reason. States he feels as if his skin is crawling. Please advise.

## 2022-05-02 NOTE — TELEPHONE ENCOUNTER
Spoke with the patient and his wife.  They feel he has gotten worse since starting the Vyvanse.  Advised patient we would discontinue the Vyvanse, and try a short acting methylphenidate in its place.  If patient has same response to methylphenidate, advised patient we will likely begin to titrate down his medications, and start fresh.  Patient expressed understanding.  Patient's JAYY report reviewed and deemed appropriate.

## 2022-05-09 ENCOUNTER — TELEPHONE (OUTPATIENT)
Dept: PSYCHIATRY | Facility: CLINIC | Age: 27
End: 2022-05-09

## 2022-05-09 NOTE — TELEPHONE ENCOUNTER
"Suraj has called in with some concerns. He is unsure if it is just the Ritalin or a combination of both, he did stat that you mentioned taking him off all medication.     He said \"my medication is making me feel on edge, hyped up, and very quick to anger\"       Can you please advise?     Thanks.   "

## 2022-05-10 RX ORDER — FAMOTIDINE 40 MG/1
40 TABLET, FILM COATED ORAL 2 TIMES DAILY
COMMUNITY
Start: 2022-04-27 | End: 2022-06-23 | Stop reason: SDDI

## 2022-05-10 NOTE — TELEPHONE ENCOUNTER
I am going to start titrating some of his medications down. He was already taking most of his medications when he first came in. This process will take some time and does come with some risks

## 2022-05-12 ENCOUNTER — DOCUMENTATION (OUTPATIENT)
Dept: PSYCHIATRY | Facility: CLINIC | Age: 27
End: 2022-05-12

## 2022-05-12 ENCOUNTER — TELEPHONE (OUTPATIENT)
Dept: PSYCHIATRY | Facility: CLINIC | Age: 27
End: 2022-05-12

## 2022-05-12 NOTE — TELEPHONE ENCOUNTER
Luis called stating he is having a very hard time, States he is very depressed, jimenes, and anxious. States he is having Panic attacks. Requests a call back from you, states he needs to discuss his medication and other issues he would rather not discuss with me. Please Advise.

## 2022-05-12 NOTE — PROGRESS NOTES
Called the patient and spoke with him.  He says he is continuing to struggle.  He says he feels that something bad is going to happen, feels that he cannot control anything, and feels on edge all the time.  Discussed titrating down his medications as we had previously talked about.  Patient reports his wife and mother are concerned about taking him off of everything.  Advised the patient because he presented on so many medications, we do not know what his baseline is, and it is important to make sure we are treating his symptoms, as opposed to potential adverse effects of medications.  Patient expresses understanding.  Advised him we would do this slowly, and he already has an appointment scheduled to be seen next week.  Patient will discontinue Ritalin and Wellbutrin today, and we will decrease his Zoloft to 50 mg daily.  He is going to maintain taking his Trileptal and Risperdal at their current doses.  Patient expresses understanding.  Advised him we would follow up at his appointment next week and discuss how he is doing, and then make a plan from there.

## 2022-05-19 ENCOUNTER — OFFICE VISIT (OUTPATIENT)
Dept: PSYCHIATRY | Facility: CLINIC | Age: 27
End: 2022-05-19

## 2022-05-19 VITALS
BODY MASS INDEX: 39.73 KG/M2 | WEIGHT: 299.8 LBS | DIASTOLIC BLOOD PRESSURE: 82 MMHG | HEIGHT: 73 IN | SYSTOLIC BLOOD PRESSURE: 122 MMHG

## 2022-05-19 DIAGNOSIS — F33.2 SEVERE EPISODE OF RECURRENT MAJOR DEPRESSIVE DISORDER, WITHOUT PSYCHOTIC FEATURES: Chronic | ICD-10-CM

## 2022-05-19 DIAGNOSIS — F90.2 ADHD (ATTENTION DEFICIT HYPERACTIVITY DISORDER), COMBINED TYPE: ICD-10-CM

## 2022-05-19 DIAGNOSIS — F41.1 GENERALIZED ANXIETY DISORDER: Primary | Chronic | ICD-10-CM

## 2022-05-19 DIAGNOSIS — F63.81 INTERMITTENT EXPLOSIVE DISORDER IN ADULT: ICD-10-CM

## 2022-05-19 PROCEDURE — 99214 OFFICE O/P EST MOD 30 MIN: CPT | Performed by: NURSE PRACTITIONER

## 2022-05-19 NOTE — PROGRESS NOTES
"Chief Complaint  ADHD, Agitation, Anxiety, and Depression    Subjective           Luis Darby presents to [facility] for medication management of his ADHD, anxiety, IED, depression.    History of Present Illness: Patient presents today for follow-up appointment after last being seen 04/14/2022.  Patient says \"I am doing absolutely terrible\".  He is accompanied to the appointment by his wife who reports he has been continuing to struggle with his agitation, outbursts, and explosiveness.  Patient was started on a medication taper last week, and is now taking Risperdal 1 mg twice daily, Trileptal 300 mg twice daily, and Zoloft 50 mg daily.  He reports he is having a lot of panic, anxiety, irritability, impulsivity, and says \"I am just constantly worrying about everything\".  Patient reports he has not yet had his follow-up appointment with sleep medicine, and that is scheduled for next month.  He has received his CPAP and has been using it.  He says he is still trying to get used to it, and has some questions for sleep medicine at his appointment.  Patient reports he is eating well, and denies any issues there.  Patient denies any SI/HI, A/V hallucinations.    Current Medications:   Current Outpatient Medications   Medication Sig Dispense Refill   • famotidine (PEPCID) 40 MG tablet Take 40 mg by mouth 2 (Two) Times a Day.     • ibuprofen (ADVIL,MOTRIN) 800 MG tablet TAKE 1 TABLET BY MOUTH THREE TIMES DAILY FOR 30 DAYS WITH FOOD OR MILK     • omeprazole (priLOSEC) 40 MG capsule Take 1 capsule by mouth Daily. 30 capsule 5   • OXcarbazepine (TRILEPTAL) 300 MG tablet Take 1 tablet by mouth 2 (Two) Times a Day. 60 tablet 2   • risperiDONE (RisperDAL) 1 MG tablet Take 1 tablet by mouth 2 (Two) Times a Day. 60 tablet 2   • sertraline (Zoloft) 100 MG tablet Take 1 tablet by mouth Daily. (Patient taking differently: Take 50 mg by mouth Daily.) 30 tablet 2     No current " "facility-administered medications for this visit.       Mental Status Exam:   Hygiene:   fair  Cooperation:  Cooperative  Eye Contact:  Good  Psychomotor Behavior:  Restless  Affect:  Appropriate  Mood: depressed, anxious, panicky, irritable and fluctates  Speech:  Normal  Thought Process:  Goal directed  Thought Content:  Mood congruent  Suicidal:  None  Homicidal:  None  Hallucinations:  None  Delusion:  None  Memory:  Intact  Orientation:  Person, Place, Time and Situation  Reliability:  fair  Insight:  Fair  Judgement:  Fair  Impulse Control:  Poor  Physical/Medical Issues:  No        Objective   Vital Signs:   /82   Ht 185.4 cm (73\")   Wt 136 kg (299 lb 12.8 oz)   BMI 39.55 kg/m²     Physical Exam  Neurological:      Mental Status: He is oriented to person, place, and time. Mental status is at baseline.      Coordination: Coordination is intact.      Gait: Gait is intact.   Psychiatric:         Behavior: Behavior is cooperative.        Result Review :     The following data was reviewed by: CLAUDIA Thomas on 05/19/2022:    Data reviewed: Previous note, medication history          Assessment and Plan    Problem List Items Addressed This Visit    None     Visit Diagnoses     Generalized anxiety disorder  (Chronic)   -  Primary    ADHD (attention deficit hyperactivity disorder), combined type        Intermittent explosive disorder in adult        Severe episode of recurrent major depressive disorder, without psychotic features (HCC)  (Chronic)             PHQ-9 Score:   PHQ-9 Total Score: 22      Depression Screening:  Patient screened positive for depression based on a PHQ-9 score of 22 on 5/19/2022. Follow-up recommendations include: Prescribed antidepressant medication treatment and Suicide Risk Assessment performed.        Tobacco Cessation:  Luis Darby  reports that he has been smoking. He has been smoking about 1.00 pack per day. He uses smokeless tobacco.. I have educated him on the risk " of diseases from using tobacco products such as cancer, COPD and heart disease.     I advised him to quit and he is not willing to quit.    I spent 3  minutes counseling the patient.           Impression/Plan:  -This is a follow-up appointment.  Patient presents today and reports he has continued to struggle heavily with his anxiety, irritability, explosiveness since his last appointment.  We are in the process of tapering all of his medications in an effort to start fresh.  Advised patient this process must be done slowly in order to prevent worse issues from occurring.  He expresses understanding.  Advised patient we would continue to taper down today, and that I would be calling him every 7 days for changes and updates from him.  -Discontinue Zoloft 50 mg daily.  Patient took today's dose, he will not take any moving forward.  -Decrease Risperdal to 1 mg every morning x7 days.  Advised patient I would see how he is doing next week, and we will either reduce his dose to 0.5 mg every morning, or stopped taking it completely.  -Maintain Trileptal 300 mg twice daily.  Advised patient next week we will potentially decrease this to 300 mg once daily.  -Schedule follow-up appointment for 6 weeks or as needed.    MEDS ORDERED DURING VISIT:  No orders of the defined types were placed in this encounter.        Follow Up   Return in about 6 weeks (around 6/30/2022), or if symptoms worsen or fail to improve, for Next scheduled follow up.  Patient was given instructions and counseling regarding his condition or for health maintenance advice. Please see specific information pulled into the AVS if appropriate.     Answers for HPI/ROS submitted by the patient on 5/17/2022  What is the primary reason for your visit?: Other  Please describe your symptoms.: Medicne not working  Have you had these symptoms before?: Yes  How long have you been having these symptoms?: Greater than 2 weeks    TREATMENT PLAN/GOALS: Continue supportive  psychotherapy efforts and medications as indicated. Treatment and medication options discussed during today's visit. Patient acknowledged and verbally consented to continue with current treatment plan and was educated on the importance of compliance with treatment and follow-up appointments.    MEDICATION ISSUES:  Discussed medication options and treatment plan of prescribed medication as well as the risks, benefits, and side effects including potential falls, possible impaired driving and metabolic adversities among others. Patient is agreeable to call the office with any worsening of symptoms or onset of side effects. Patient is agreeable to call 911 or go to the nearest ER should he/she begin having SI/HI.          This document has been electronically signed by CLAUDIA Rogers, PMHNP-BC  May 20, 2022 13:58 EDT      Part of this note may be an electronic transcription/translation of spoken language to printed text using the Dragon Dictation System.

## 2022-05-26 ENCOUNTER — DOCUMENTATION (OUTPATIENT)
Dept: PSYCHIATRY | Facility: CLINIC | Age: 27
End: 2022-05-26

## 2022-05-26 NOTE — PROGRESS NOTES
"Spoke with the patient outlined the next steps of his medication decrease.  Patient reports he has continued to struggle over the last week.  He has been taking Trileptal 300 mg twice daily, and Risperdal 1 mg every morning.  Advised patient over the next 7 days to decrease his Risperdal to 0.5 mg (0.5 tab) every morning, and decrease Trileptal to 300 mg every morning.  Patient also reports today he has been experiencing periods of \"blacking out\".  He reports it takes several minutes for him to return to full consciousness.  Patient has never seen anyone with neurology, and reports he does not like his current primary care.  He would prefer a referral to [facility] primary care.  I advised him I would also be referring him to Psychiatric neurology.  Advised patient I would contact him next Thursday, 06/02/2022 for the last step in his medication taper.  Patient expresses understanding.  "

## 2022-06-02 ENCOUNTER — DOCUMENTATION (OUTPATIENT)
Dept: PSYCHIATRY | Facility: CLINIC | Age: 27
End: 2022-06-02

## 2022-06-02 NOTE — PROGRESS NOTES
Spoke with patient on the phone for the final step in his medication taper.  He has been taking Risperdal 0.5 mg every morning, and Trileptal 300 mg every morning for the last 7 days.  Advised patient to discontinue his Risperdal, and reduce his Trileptal to 150 mg every morning for the next 7 days.  Patient will then stop his Trileptal on 06/09/2022.  We will then wait 2 weeks to allow all medications to clear his system, and then establish a new medication treatment plan.  Patient currently has an appointment scheduled for 06/29/2022.  Advised him we would change this appointment to 06/23/2022 to coincide with a 2-week felix of his medication washout.  Patient expresses understanding and is in agreement with this plan.

## 2022-06-03 ENCOUNTER — PATIENT ROUNDING (BHMG ONLY) (OUTPATIENT)
Dept: FAMILY MEDICINE CLINIC | Facility: CLINIC | Age: 27
End: 2022-06-03

## 2022-06-03 ENCOUNTER — OFFICE VISIT (OUTPATIENT)
Dept: FAMILY MEDICINE CLINIC | Facility: CLINIC | Age: 27
End: 2022-06-03

## 2022-06-03 VITALS
OXYGEN SATURATION: 98 % | WEIGHT: 297 LBS | TEMPERATURE: 98.2 F | BODY MASS INDEX: 39.36 KG/M2 | SYSTOLIC BLOOD PRESSURE: 140 MMHG | HEIGHT: 73 IN | HEART RATE: 83 BPM | DIASTOLIC BLOOD PRESSURE: 74 MMHG

## 2022-06-03 DIAGNOSIS — R53.83 FATIGUE, UNSPECIFIED TYPE: ICD-10-CM

## 2022-06-03 DIAGNOSIS — F31.9 BIPOLAR AFFECTIVE DISORDER, REMISSION STATUS UNSPECIFIED: ICD-10-CM

## 2022-06-03 DIAGNOSIS — R55 SYNCOPE, UNSPECIFIED SYNCOPE TYPE: Primary | ICD-10-CM

## 2022-06-03 DIAGNOSIS — M25.50 ARTHRALGIA, UNSPECIFIED JOINT: ICD-10-CM

## 2022-06-03 PROCEDURE — 99214 OFFICE O/P EST MOD 30 MIN: CPT | Performed by: NURSE PRACTITIONER

## 2022-06-03 RX ORDER — DICLOFENAC SODIUM 75 MG/1
75 TABLET, DELAYED RELEASE ORAL 2 TIMES DAILY
Qty: 60 TABLET | Refills: 2 | Status: SHIPPED | OUTPATIENT
Start: 2022-06-03 | End: 2022-06-23 | Stop reason: SDDI

## 2022-06-03 NOTE — PROGRESS NOTES
"       New Patient History and Physical      Referring Physician: Susannah Hemphill A*    Subjective     Chief Complaint:    Chief Complaint   Patient presents with   • Consult     Anxiety         History of Present Illness:   Prior patient of Lida Durbin.   Lives in [facility]  He is having pass out episodes, happens off and on for the past 1-2 years. He notes he will get up at times and then will pass out. Sometimes happens will happen when he gets really mad, other times random. He notes family will decribe him as unconcous. He will breath and eyes will be closed. He has no recolection of this events. He will wake up and then feel back to baseline. Other times will wake up confused with blurry vision. Will sometimes hit his head. Does not loose control of bladder or bowels. Has not been describes as grey, diaphoretic, or pale in the past.   Last few episodes he was decribed at incoherent and was able to speak about things that arent there. Family told him he was saying there was a \"black monster outside trying to get him\"  He has joint pain takes motrin, hurts in his joints, motrin does not help too much, has had xray of knees before and diagnosed with OA  Has been seeing behavior health, currently being wean off all meds for wash out (note reviewed).   He has chest pain, happens prior to pass out episodes and other times. He will sometimes have chest pain and left sided pain of entire body. ECHO normal 2021. He has tried several holter monitor and is unable to keep them on due to sweating.   Had hospital stay at [facility] with normal CVA workup in March 2021.       Review of Systems   Gen- No fevers, chills  Resp- No cough, dyspnea  GI- No N/V/D, abd pain  Neuro-No dizziness, headaches    Past Medical History:   Past Medical History:   Diagnosis Date   • ADHD    • Anxiety    • Depression    • GERD (gastroesophageal reflux disease)    • Hypersomnolence    • Intermittent explosive disorder in adult    • Mood " "disorder (HCC)    • Patellofemoral stress syndrome        Past Surgical History:  Past Surgical History:   Procedure Laterality Date   • INCISION AND DRAINAGE ABSCESS  06/2021       Family History: family history includes Cancer in his mother.    Social History:  reports that he has been smoking. He has been smoking about 2.50 packs per day. He uses smokeless tobacco. He reports current alcohol use. He reports that he does not use drugs.    Medications:    Current Outpatient Medications:   •  famotidine (PEPCID) 40 MG tablet, Take 40 mg by mouth 2 (Two) Times a Day., Disp: , Rfl:   •  OXcarbazepine (TRILEPTAL) 300 MG tablet, Take 1 tablet by mouth 2 (Two) Times a Day. (Patient taking differently: Take 150 mg by mouth Daily.), Disp: 60 tablet, Rfl: 2  •  diclofenac (VOLTAREN) 75 MG EC tablet, Take 1 tablet by mouth 2 (Two) Times a Day., Disp: 60 tablet, Rfl: 2  •  omeprazole (priLOSEC) 40 MG capsule, Take 1 capsule by mouth Daily., Disp: 30 capsule, Rfl: 5    Allergies:  No Known Allergies    Objective     Vital Signs:   Vitals:    06/03/22 1037   BP: 140/74   BP Location: Left arm   Patient Position: Sitting   Cuff Size: Large Adult   Pulse: 83   Temp: 98.2 °F (36.8 °C)   TempSrc: Oral   SpO2: 98%   Weight: 135 kg (297 lb)   Height: 185.4 cm (73\")     Body mass index is 39.18 kg/m².    Physical Exam:    Physical Exam  Vitals and nursing note reviewed.   Constitutional:       Appearance: He is well-developed.   HENT:      Head: Normocephalic and atraumatic.      Nose: Nose normal.   Eyes:      Pupils: Pupils are equal, round, and reactive to light.   Neck:      Vascular: No carotid bruit.   Cardiovascular:      Rate and Rhythm: Normal rate and regular rhythm.      Heart sounds: Normal heart sounds. No murmur heard.    No friction rub. No gallop.   Pulmonary:      Effort: Pulmonary effort is normal.      Breath sounds: Normal breath sounds.   Abdominal:      General: Bowel sounds are normal. There is no distension.    "   Palpations: Abdomen is soft.      Tenderness: There is no abdominal tenderness.   Musculoskeletal:      Cervical back: Neck supple.   Lymphadenopathy:      Head:      Right side of head: No submental, submandibular, tonsillar, preauricular or posterior auricular adenopathy.      Left side of head: No submental, submandibular, tonsillar, preauricular or posterior auricular adenopathy.   Skin:     General: Skin is warm and dry.      Capillary Refill: Capillary refill takes less than 2 seconds.   Neurological:      General: No focal deficit present.      Mental Status: He is alert and oriented to person, place, and time.   Psychiatric:         Mood and Affect: Mood normal.         Behavior: Behavior normal.         Assessment / Plan     Assessment/Plan:   Problem List Items Addressed This Visit    None     Visit Diagnoses     Syncope, unspecified syncope type    -  Primary    Relevant Orders    Comprehensive Metabolic Panel    TSH Rfx On Abnormal To Free T4    Hemoglobin A1c    Ambulatory Referral to Cardiology    EEG    Fatigue, unspecified type        Relevant Orders    CBC Auto Differential    Vitamin B12    Folate    Arthralgia, unspecified joint        Relevant Medications    diclofenac (VOLTAREN) 75 MG EC tablet    Other Relevant Orders    TSH Rfx On Abnormal To Free T4    HAYLEY With / DsDNA, RNP, Sjogrens A / B, Smith    Cyclic Citrul Peptide Antibody, IgG / IgA    Rheumatoid Factor, Quant    Sedimentation Rate    C-reactive Protein    Bipolar affective disorder, remission status unspecified (HCC)              -- episodes sound like conversion disorder however need to rule out any other episodes. He already has pending referral to neuro, will order EEG. Check labs. Will refer to cards for syncopal eval. ECHO normal. Has been unable to do holter.   -- volataren for joint pain  -- keep f/u with behav health    I have reviewed pertinent health maintenance applicable to this patient.    Follow up:  Return for 4-6  weeks.    Electronically signed by CLAUDIA Pardo   06/03/2022 10:50 EDT      Please note that portions of this note may have been completed with a voice recognition program. Efforts were made to edit the dictations, but occasionally words are mistranscribed.

## 2022-06-07 LAB
ALBUMIN SERPL-MCNC: 4.8 G/DL (ref 3.5–5.2)
ALBUMIN/GLOB SERPL: 2 G/DL
ALP SERPL-CCNC: 73 U/L (ref 39–117)
ALT SERPL-CCNC: 37 U/L (ref 1–41)
ANA SER QL: NEGATIVE
AST SERPL-CCNC: 20 U/L (ref 1–40)
BASOPHILS # BLD AUTO: 0.07 10*3/MM3 (ref 0–0.2)
BASOPHILS NFR BLD AUTO: 1.1 % (ref 0–1.5)
BILIRUB SERPL-MCNC: 0.3 MG/DL (ref 0–1.2)
BUN SERPL-MCNC: 10 MG/DL (ref 6–20)
BUN/CREAT SERPL: 11.2 (ref 7–25)
CALCIUM SERPL-MCNC: 9.6 MG/DL (ref 8.6–10.5)
CCP IGA+IGG SERPL IA-ACNC: 2 UNITS (ref 0–19)
CHLORIDE SERPL-SCNC: 105 MMOL/L (ref 98–107)
CO2 SERPL-SCNC: 24.3 MMOL/L (ref 22–29)
CREAT SERPL-MCNC: 0.89 MG/DL (ref 0.76–1.27)
CRP SERPL-MCNC: 0.33 MG/DL (ref 0–0.5)
EGFRCR SERPLBLD CKD-EPI 2021: 122 ML/MIN/1.73
EOSINOPHIL # BLD AUTO: 0.56 10*3/MM3 (ref 0–0.4)
EOSINOPHIL NFR BLD AUTO: 8.8 % (ref 0.3–6.2)
ERYTHROCYTE [DISTWIDTH] IN BLOOD BY AUTOMATED COUNT: 14 % (ref 12.3–15.4)
ERYTHROCYTE [SEDIMENTATION RATE] IN BLOOD BY WESTERGREN METHOD: 2 MM/HR (ref 0–15)
FOLATE SERPL-MCNC: 11.5 NG/ML (ref 4.78–24.2)
GLOBULIN SER CALC-MCNC: 2.4 GM/DL
GLUCOSE SERPL-MCNC: 105 MG/DL (ref 65–99)
HBA1C MFR BLD: 5.1 % (ref 4.8–5.6)
HCT VFR BLD AUTO: 40.3 % (ref 37.5–51)
HGB BLD-MCNC: 13.7 G/DL (ref 13–17.7)
IMM GRANULOCYTES # BLD AUTO: 0.01 10*3/MM3 (ref 0–0.05)
IMM GRANULOCYTES NFR BLD AUTO: 0.2 % (ref 0–0.5)
LYMPHOCYTES # BLD AUTO: 1.77 10*3/MM3 (ref 0.7–3.1)
LYMPHOCYTES NFR BLD AUTO: 27.8 % (ref 19.6–45.3)
MCH RBC QN AUTO: 30.1 PG (ref 26.6–33)
MCHC RBC AUTO-ENTMCNC: 34 G/DL (ref 31.5–35.7)
MCV RBC AUTO: 88.6 FL (ref 79–97)
MONOCYTES # BLD AUTO: 0.42 10*3/MM3 (ref 0.1–0.9)
MONOCYTES NFR BLD AUTO: 6.6 % (ref 5–12)
NEUTROPHILS # BLD AUTO: 3.53 10*3/MM3 (ref 1.7–7)
NEUTROPHILS NFR BLD AUTO: 55.5 % (ref 42.7–76)
NRBC BLD AUTO-RTO: 0 /100 WBC (ref 0–0.2)
PLATELET # BLD AUTO: 310 10*3/MM3 (ref 140–450)
POTASSIUM SERPL-SCNC: 3.8 MMOL/L (ref 3.5–5.2)
PROT SERPL-MCNC: 7.2 G/DL (ref 6–8.5)
RBC # BLD AUTO: 4.55 10*6/MM3 (ref 4.14–5.8)
RHEUMATOID FACT SERPL-ACNC: <10 IU/ML
SODIUM SERPL-SCNC: 141 MMOL/L (ref 136–145)
TSH SERPL DL<=0.005 MIU/L-ACNC: 3.6 UIU/ML (ref 0.27–4.2)
VIT B12 SERPL-MCNC: 716 PG/ML (ref 211–946)
WBC # BLD AUTO: 6.36 10*3/MM3 (ref 3.4–10.8)

## 2022-06-09 ENCOUNTER — TELEPHONE (OUTPATIENT)
Dept: PSYCHIATRY | Facility: CLINIC | Age: 27
End: 2022-06-09

## 2022-06-23 ENCOUNTER — OFFICE VISIT (OUTPATIENT)
Dept: PSYCHIATRY | Facility: CLINIC | Age: 27
End: 2022-06-23

## 2022-06-23 VITALS
WEIGHT: 287 LBS | SYSTOLIC BLOOD PRESSURE: 134 MMHG | BODY MASS INDEX: 38.04 KG/M2 | HEART RATE: 70 BPM | HEIGHT: 73 IN | DIASTOLIC BLOOD PRESSURE: 80 MMHG

## 2022-06-23 DIAGNOSIS — F33.2 SEVERE EPISODE OF RECURRENT MAJOR DEPRESSIVE DISORDER, WITHOUT PSYCHOTIC FEATURES: Primary | Chronic | ICD-10-CM

## 2022-06-23 DIAGNOSIS — F41.1 GENERALIZED ANXIETY DISORDER: Chronic | ICD-10-CM

## 2022-06-23 PROCEDURE — 99214 OFFICE O/P EST MOD 30 MIN: CPT | Performed by: NURSE PRACTITIONER

## 2022-06-23 NOTE — PROGRESS NOTES
"Chief Complaint  Anxiety and Depression    Subjective          Luis Darby presents to [facility] for medication management of anxiety and depression.    History of Present Illness: Patient presents today for a follow-up appointment after last being seen on 05/19/2022.  Over the last 5 weeks, the patient has successfully completed a med wash and for the last 2 weeks has not taken any psychiatric medication.  Patient presents today and says \"some days are good, some days are bad\".  He recently moved to [facility] and is very happy to be away from his previous neighbor.  Patient has been much more calm since stopping his medications.  He is not as irritable as he was.  His wife feels he has not been doing very bad over the last week.  They both acknowledge the process of tapering down his medications and stopping them was difficult, however the patient and his wife both report he is doing better now overall.  Patient says he feels his biggest issue now is that his mood is low.  Patient denies any SI/HI, A/V hallucinations.    Current Medications:   No current outpatient medications on file.     No current facility-administered medications for this visit.       Mental Status Exam:   Hygiene:   fair  Cooperation:  Cooperative  Eye Contact:  Good  Psychomotor Behavior:  Appropriate  Affect:  Appropriate  Mood: depressed and anxious  Speech:  Normal  Thought Process:  Goal directed  Thought Content:  Mood congruent  Suicidal:  None  Homicidal:  None  Hallucinations:  None  Delusion:  None  Memory:  Intact  Orientation:  Person, Place, Time and Situation  Reliability:  fair  Insight:  Good  Judgement:  Good  Impulse Control:  Fair  Physical/Medical Issues:  No        Objective   Vital Signs:   /80   Pulse 70   Ht 185.4 cm (73\")   Wt 130 kg (287 lb)   BMI 37.87 kg/m²     Physical Exam  Neurological:      Mental Status: He is oriented to person, place, and time. " Mental status is at baseline.      Coordination: Coordination is intact.      Gait: Gait is intact.   Psychiatric:         Behavior: Behavior is cooperative.        Result Review :     The following data was reviewed by: CLAUDIA Thomas on 06/23/2022:    Data reviewed: Previous note, medication history          Assessment and Plan    Problem List Items Addressed This Visit    None     Visit Diagnoses     Severe episode of recurrent major depressive disorder, without psychotic features (HCC)  (Chronic)   -  Primary    Generalized anxiety disorder  (Chronic)             PHQ-9 Score:   PHQ-9 Total Score: 21      Depression Screening:  Patient screened positive for depression based on a PHQ-9 score of 21 on 6/23/2022. Follow-up recommendations include: Suicide Risk Assessment performed and Genesight test performed.        Tobacco Cessation:  Luis Darby  reports that he has been smoking. He has been smoking about 2.50 packs per day. He uses smokeless tobacco.. I have educated him on the risk of diseases from using tobacco products such as cancer, COPD and heart disease.     I advised him to quit and he is not willing to quit.    I spent 3  minutes counseling the patient.         Impression/Plan:  -This is my first follow-up appointment with the patient after completing his medication taper.  The patient appears to be much better than at his previous evaluations.  He is significantly calmer, he is able to sit still, and is visibly less agitated and aggravated than he typically has been.  His only complaint today is that his mood is not doing very good.  Patient endorses feeling depressed on a regular basis.  He also continues to endorse high anxiety on a regular basis, but also acknowledges the severity of those anxiety symptoms he has much better.  Patient has an extensive history of various medications from previous prescribers.  Discussed doing a Genesight test before starting a new medication regimen.   "Patient reports he is open to doing this and says \"I do not want to just throw darts at things anymore\".  -Genesight test performed today in office.  Advised patient I would be in contact with him regarding the results and a medication plan when he returns.  -Schedule follow-up appointment for 6 weeks or as needed.    MEDS ORDERED DURING VISIT:  No orders of the defined types were placed in this encounter.        Follow Up   Return in about 6 weeks (around 8/4/2022), or if symptoms worsen or fail to improve, for Next scheduled follow up.  Patient was given instructions and counseling regarding his condition or for health maintenance advice. Please see specific information pulled into the AVS if appropriate.       TREATMENT PLAN/GOALS: Continue supportive psychotherapy efforts and medications as indicated. Treatment and medication options discussed during today's visit. Patient acknowledged and verbally consented to continue with current treatment plan and was educated on the importance of compliance with treatment and follow-up appointments.    MEDICATION ISSUES:  Discussed medication options and treatment plan of prescribed medication as well as the risks, benefits, and side effects including potential falls, possible impaired driving and metabolic adversities among others. Patient is agreeable to call the office with any worsening of symptoms or onset of side effects. Patient is agreeable to call 911 or go to the nearest ER should he/she begin having SI/HI.          This document has been electronically signed by CLAUDIA Rogers, PMHNP-BC  June 23, 2022 16:57 EDT      Part of this note may be an electronic transcription/translation of spoken language to printed text using the Dragon Dictation System.  "

## 2022-06-28 ENCOUNTER — TELEPHONE (OUTPATIENT)
Dept: PSYCHIATRY | Facility: CLINIC | Age: 27
End: 2022-06-28

## 2022-06-28 NOTE — TELEPHONE ENCOUNTER
ADVISED PT, HE VOICED HIS UNDERSTANDING.  
PT STATES AS SOON AS HE WAKES UP HE STAYS MAD HALF THE DAY AND CAN'T FUNCTION. ALSO WANTED TO KNOW RESULTS OF [employer] TEST WE HAVE NOT GOT THAT BACK YET. I ADVISED PT YOU MAY WAIT UNTIL YOU GET THEM BACK TO ADVISE ON ANY MEDICATIONS. PLEASE ADVISE  
Yes, I am waiting on those results until I start any medications. Thank you.
Home

## 2022-06-29 ENCOUNTER — TELEPHONE (OUTPATIENT)
Dept: CARDIOLOGY | Facility: CLINIC | Age: 27
End: 2022-06-29

## 2022-07-06 ENCOUNTER — TELEPHONE (OUTPATIENT)
Dept: PSYCHIATRY | Facility: CLINIC | Age: 27
End: 2022-07-06

## 2022-07-06 NOTE — TELEPHONE ENCOUNTER
Suraj called in to see if genesight test results were back? Asking about medications. I printed and scanned in results into media. Also, I put the hard copy on your desk. Ask for call back with med recommendations. Thanks

## 2022-07-06 NOTE — TELEPHONE ENCOUNTER
Spoke with Suraj. Advised him I would be in touch by Friday with a medication plan. He expressed understanding.

## 2022-07-07 DIAGNOSIS — F33.2 SEVERE EPISODE OF RECURRENT MAJOR DEPRESSIVE DISORDER, WITHOUT PSYCHOTIC FEATURES: Primary | Chronic | ICD-10-CM

## 2022-07-07 RX ORDER — VORTIOXETINE 10 MG/1
10 TABLET, FILM COATED ORAL EVERY MORNING
Qty: 30 TABLET | Refills: 2 | Status: SHIPPED | OUTPATIENT
Start: 2022-07-07 | End: 2022-09-29 | Stop reason: SDUPTHER

## 2022-07-07 NOTE — PROGRESS NOTES
Spoke with patient, reviewed [facility] results. Starting Trintellix 10mg daily. We discussed risks versus benefits, as well as potential adverse effects associated with adding this medication to patient's daily regimen. Patient is in agreement with this plan and was educated on the importance of compliance with all aspects of treatment and follow-up appointments. Patient is agreeable to call the office with any worsening of symptoms or onset of side effects.

## 2022-07-13 ENCOUNTER — TELEPHONE (OUTPATIENT)
Dept: PSYCHIATRY | Facility: CLINIC | Age: 27
End: 2022-07-13

## 2022-07-13 NOTE — TELEPHONE ENCOUNTER
Patient called stating he has had a terrible weekend and past few days and needs to talk to you directly to help him with some issues. Please Advise.

## 2022-07-21 ENCOUNTER — TELEPHONE (OUTPATIENT)
Dept: GASTROENTEROLOGY | Facility: CLINIC | Age: 27
End: 2022-07-21

## 2022-07-21 DIAGNOSIS — K21.9 GASTROESOPHAGEAL REFLUX DISEASE, UNSPECIFIED WHETHER ESOPHAGITIS PRESENT: Primary | ICD-10-CM

## 2022-07-21 RX ORDER — FAMOTIDINE 40 MG/1
40 TABLET, FILM COATED ORAL 2 TIMES DAILY
Qty: 60 TABLET | Refills: 5 | Status: SHIPPED | OUTPATIENT
Start: 2022-07-21 | End: 2022-08-04

## 2022-07-21 RX ORDER — OMEPRAZOLE 40 MG/1
40 CAPSULE, DELAYED RELEASE ORAL DAILY
Qty: 30 CAPSULE | Refills: 5 | Status: SHIPPED | OUTPATIENT
Start: 2022-07-21 | End: 2022-08-04 | Stop reason: SDDI

## 2022-07-21 NOTE — TELEPHONE ENCOUNTER
----- Message from Rosie Moffett MA sent at 7/21/2022 11:40 AM EDT -----  Received paper requests for refills on Famotidine and Omeprazole.  To [facility]

## 2022-07-26 ENCOUNTER — TELEPHONE (OUTPATIENT)
Dept: PSYCHIATRY | Facility: CLINIC | Age: 27
End: 2022-07-26

## 2022-07-26 NOTE — TELEPHONE ENCOUNTER
Suraj called in today asking to speak with you. I asked what it was in regards too, he said you had told him you would call him back, and he thought he may have missed your call. cbr- [phone]. Let me know if I need to do anything further. thanks

## 2022-07-27 NOTE — TELEPHONE ENCOUNTER
Tried calling the patient back but no answer and no vm option. I had not called him, and reviewing my notes I didn't have anything I needed to discuss with him prior to his next appt. Thank you.

## 2022-08-04 ENCOUNTER — OFFICE VISIT (OUTPATIENT)
Dept: PSYCHIATRY | Facility: CLINIC | Age: 27
End: 2022-08-04

## 2022-08-04 VITALS
HEART RATE: 95 BPM | BODY MASS INDEX: 36.18 KG/M2 | WEIGHT: 273 LBS | DIASTOLIC BLOOD PRESSURE: 88 MMHG | HEIGHT: 73 IN | SYSTOLIC BLOOD PRESSURE: 138 MMHG

## 2022-08-04 DIAGNOSIS — F33.2 SEVERE EPISODE OF RECURRENT MAJOR DEPRESSIVE DISORDER, WITHOUT PSYCHOTIC FEATURES: Primary | Chronic | ICD-10-CM

## 2022-08-04 DIAGNOSIS — F41.1 GENERALIZED ANXIETY DISORDER: Chronic | ICD-10-CM

## 2022-08-04 PROCEDURE — 99214 OFFICE O/P EST MOD 30 MIN: CPT | Performed by: NURSE PRACTITIONER

## 2022-08-04 RX ORDER — DICLOFENAC SODIUM 75 MG/1
75 TABLET, DELAYED RELEASE ORAL 2 TIMES DAILY
COMMUNITY
Start: 2022-07-26 | End: 2022-08-04

## 2022-08-04 NOTE — PROGRESS NOTES
"Chief Complaint  Anxiety and Depression    Subjective          Luis Darby presents to [facility] for medication management of his anxiety and depression.    History of Present Illness: Patient presents today for follow-up appointment after last being seen on 06/23/2022.  Patient says he has been feeling somewhat better with regards to his mood.  He says he is not as depressed, and is not wanting to die or hurt himself.  However he says \"I have no motivation, and I cannot go to sleep until 2 or 3 in the morning\".  His wife is frustrated with his behaviors, and says he is still arguing with his mother on a daily basis.  \"He is a jerk, he can control his temper around the kids\".  They both report so much of his difficulty centers on his poor relationship with his mother, and this has gotten worse since his mother's boyfriend passed away.  Patient had some initial nausea with the Trintellix, but this has gotten better with eating when he takes it.  Patient denies any SI/HI, A/V hallucinations.     Current Medications:   Current Outpatient Medications   Medication Sig Dispense Refill   • Vortioxetine HBr (Trintellix) 10 MG tablet Take 10 mg by mouth Every Morning. 30 tablet 2     No current facility-administered medications for this visit.       Mental Status Exam:   Hygiene:   fair  Cooperation:  Cooperative  Eye Contact:  Good  Psychomotor Behavior:  Restless  Affect:  Appropriate  Mood: depressed  Speech:  Normal  Thought Process:  Goal directed  Thought Content:  Mood congruent  Suicidal:  None  Homicidal:  None  Hallucinations:  None  Delusion:  None  Memory:  Intact  Orientation:  Person, Place, Time and Situation  Reliability:  fair  Insight:  Fair  Judgement:  Fair  Impulse Control:  Poor  Physical/Medical Issues:  Yes GERD, TIESHA       Objective   Vital Signs:   /88   Pulse 95   Ht 185.4 cm (73\")   Wt 124 kg (273 lb)   BMI 36.02 kg/m²     Physical " Exam  Neurological:      Mental Status: He is oriented to person, place, and time. Mental status is at baseline.      Coordination: Coordination is intact.      Gait: Gait is intact.   Psychiatric:         Behavior: Behavior is cooperative.        Result Review :     The following data was reviewed by: CLAUDIA Thomas on 08/04/2022:    Data reviewed: Previous note, medication history          Assessment and Plan    Problem List Items Addressed This Visit    None     Visit Diagnoses     Severe episode of recurrent major depressive disorder, without psychotic features (HCC)  (Chronic)   -  Primary    Generalized anxiety disorder  (Chronic)             PHQ-9 Score:   PHQ-9 Total Score: 24      Depression Screening:  Patient screened positive for depression based on a PHQ-9 score of 24 on 8/4/2022. Follow-up recommendations include: Prescribed antidepressant medication treatment and Suicide Risk Assessment performed.        Tobacco Cessation:  Luis Darby  reports that he has been smoking. He has been smoking about 2.50 packs per day. He uses smokeless tobacco.. I have educated him on the risk of diseases from using tobacco products such as cancer, COPD and heart disease.     I advised him to quit and he is not willing to quit.    I spent 3  minutes counseling the patient.           Impression/Plan:  -This is a follow-up appointment.  Patient presents today and reports he has continued to struggle with mood and anxiety since his last appointment.  He has been taking Trintellix for approximately 4 weeks now.  He is also struggling with sleep stemming from his obstructive sleep apnea.  Patient is still trying to adjust to using a CPAP consistently.  He reports he does have a problem with a house right now and is waiting on a new one.  Patient and his wife report he continues to struggle heavily regarding his relationship with his mother.  They fight consistently, which is worsened by the fact that live together.   His wife expresses frustration regarding having to be around both of them.  Encouraged them to make positive changes in their life to improve their relationship and the patient's mental health.  -Maintain Trintellix 10 mg daily.  -Patient's JAYY report reviewed and deemed appropriate.  -Reviewed available lab work.  -Schedule follow-up appointment for 8 weeks or as needed.    MEDS ORDERED DURING VISIT:  No orders of the defined types were placed in this encounter.        Follow Up   Return in about 8 weeks (around 9/29/2022), or if symptoms worsen or fail to improve, for Next scheduled follow up.  Patient was given instructions and counseling regarding his condition or for health maintenance advice. Please see specific information pulled into the AVS if appropriate.       TREATMENT PLAN/GOALS: Continue supportive psychotherapy efforts and medications as indicated. Treatment and medication options discussed during today's visit. Patient acknowledged and verbally consented to continue with current treatment plan and was educated on the importance of compliance with treatment and follow-up appointments.    MEDICATION ISSUES:  Discussed medication options and treatment plan of prescribed medication as well as the risks, benefits, and side effects including potential falls, possible impaired driving and metabolic adversities among others. Patient is agreeable to call the office with any worsening of symptoms or onset of side effects. Patient is agreeable to call 911 or go to the nearest ER should he/she begin having SI/HI.          This document has been electronically signed by CLAUDIA Rogers, PMHNP-BC  August 5, 2022 07:52 EDT      Part of this note may be an electronic transcription/translation of spoken language to printed text using the Dragon Dictation System.

## 2022-09-29 ENCOUNTER — OFFICE VISIT (OUTPATIENT)
Dept: PSYCHIATRY | Facility: CLINIC | Age: 27
End: 2022-09-29

## 2022-09-29 VITALS
DIASTOLIC BLOOD PRESSURE: 84 MMHG | WEIGHT: 268 LBS | HEART RATE: 88 BPM | BODY MASS INDEX: 35.52 KG/M2 | HEIGHT: 73 IN | SYSTOLIC BLOOD PRESSURE: 122 MMHG

## 2022-09-29 DIAGNOSIS — F33.2 SEVERE EPISODE OF RECURRENT MAJOR DEPRESSIVE DISORDER, WITHOUT PSYCHOTIC FEATURES: Primary | Chronic | ICD-10-CM

## 2022-09-29 DIAGNOSIS — F41.1 GENERALIZED ANXIETY DISORDER: Chronic | ICD-10-CM

## 2022-09-29 PROCEDURE — 99214 OFFICE O/P EST MOD 30 MIN: CPT | Performed by: NURSE PRACTITIONER

## 2022-09-29 RX ORDER — BREXPIPRAZOLE 0.5 MG/1
0.5 TABLET ORAL DAILY
Qty: 30 TABLET | Refills: 2 | Status: SHIPPED | OUTPATIENT
Start: 2022-09-29

## 2022-09-29 NOTE — PROGRESS NOTES
"Chief Complaint  Anxiety and Depression    Subjective          Luis Darby presents to [facility] for medication management of his anxiety and depression.    History of Present Illness: Patient presents today for follow-up appointment after last being seen on 08/04/2022.  Patient reports today he has been doing okay.  He started a job at [employer] in [place].  He does not like it \"but it pays the bills\".  His wife is also working too at a gas station.  Patient says he is not doing as bad as he was with his agitation and aggression and says \"I am not blowing up, and we are arguing and fighting like we were\".  He has also lost weight since his last appointment, and feels better physically.  He has continued to struggle however with severe depression.  Patient reports he deals with this on a daily basis, and is not sure how much the Trintellix has helped from that regard.  He also reports his sleep is still difficult.  He has reached out for a follow-up appointment with sleep medicine regarding this.  Patient denies any new or significant issues with his appetite.  Patient denies any SI/HI, A/V hallucinations.      The following portions of the patient's history were reviewed and updated as appropriate: allergies, current medications, past family history, past medical history, past social history, past surgical history and problem list.      Current Medications:   Current Outpatient Medications   Medication Sig Dispense Refill   • Vortioxetine HBr (Trintellix) 10 MG tablet tablet Take 10 mg by mouth Every Morning. 30 tablet 2   • Brexpiprazole (Rexulti) 0.5 MG tablet Take 0.5 mg by mouth Daily. 30 tablet 2     No current facility-administered medications for this visit.       Mental Status Exam:   Hygiene:   fair  Cooperation:  Cooperative  Eye Contact:  Good  Psychomotor Behavior:  Restless  Affect:  Appropriate  Mood: depressed  Speech:  Normal  Thought Process:  Goal " "directed  Thought Content:  Mood congruent  Suicidal:  None  Homicidal:  None  Hallucinations:  None  Delusion:  None  Memory:  Intact  Orientation:  Person, Place, Time and Situation  Reliability:  good  Insight:  Fair  Judgement:  Fair  Impulse Control:  Fair  Physical/Medical Issues:  No        Objective   Vital Signs:   /84   Pulse 88   Ht 185.4 cm (73\")   Wt 122 kg (268 lb)   BMI 35.36 kg/m²     Physical Exam  Neurological:      Mental Status: He is oriented to person, place, and time. Mental status is at baseline.      Coordination: Coordination is intact.      Gait: Gait is intact.   Psychiatric:         Behavior: Behavior is cooperative.        Result Review :     The following data was reviewed by: LCAUDIA Thomas on 09/29/2022:    Data reviewed: Previous note, medication history          Assessment and Plan    Diagnoses and all orders for this visit:    1. Severe episode of recurrent major depressive disorder, without psychotic features (HCC) (Primary)  -     Brexpiprazole (Rexulti) 0.5 MG tablet; Take 0.5 mg by mouth Daily.  Dispense: 30 tablet; Refill: 2  -     Vortioxetine HBr (Trintellix) 10 MG tablet tablet; Take 10 mg by mouth Every Morning.  Dispense: 30 tablet; Refill: 2    2. Generalized anxiety disorder  -     Vortioxetine HBr (Trintellix) 10 MG tablet tablet; Take 10 mg by mouth Every Morning.  Dispense: 30 tablet; Refill: 2         PHQ-9 Score:   PHQ-9 Total Score: 21      Depression Screening:  Patient screened positive for depression based on a PHQ-9 score of 21 on 9/29/2022. Follow-up recommendations include: Prescribed antidepressant medication treatment and Suicide Risk Assessment performed.        Tobacco Cessation:  Luis Darby  reports that he has been smoking. He has been smoking about 2.50 packs per day. He uses smokeless tobacco.. I have educated him on the risk of diseases from using tobacco products such as cancer, COPD and heart disease.     I advised him to quit " and he is not willing to quit.    I spent 3  minutes counseling the patient.           Impression/Plan:  -This is a follow-up appointment.  Patient presents today and reports he has been doing okay since his last appointment.  He does feel certain aspects of his anxiety and agitation have been improved with the Trintellix.  However he is still continuing to struggle on a daily basis with depression.  He does not feel that has helped significantly from that regard.  Discussed augmenting his Trintellix with Rexulti to help with his depression.  Patient has taken multiple medications with limited efficacy.  He reports he would be willing to do what ever will help.  Patient also reports he is interested in finding a different therapist.  He reports missing multiple appointments because everything was communicated through email and he does not do that very well.  Advised patient I would look into referring him to [facility] that is also out of [place].  Patient also reports he and his family are looking to move to [place] at the end of November, but he is not sure on the time frame.  -Start Rexulti 0.5 mg daily.  We discussed risks versus benefits, as well as potential adverse effects associated with adding this medication to patient's daily regimen. Patient is in agreement with this plan and was educated on the importance of compliance with all aspects of treatment and follow-up appointments. Patient is agreeable to call the office with any worsening of symptoms or onset of side effects.  -Maintain Trintellix 10 mg daily.  -Patient's JAYY report reviewed and deemed appropriate.  Patient counseled on use of controlled substances.  -Reviewed available lab work.  -Schedule follow-up appointment for 6 weeks or as needed.      MEDS ORDERED DURING VISIT:  New Medications Ordered This Visit   Medications   • Brexpiprazole (Rexulti) 0.5 MG tablet     Sig: Take 0.5 mg by mouth Daily.     Dispense:  30  tablet     Refill:  2   • Vortioxetine HBr (Trintellix) 10 MG tablet tablet     Sig: Take 10 mg by mouth Every Morning.     Dispense:  30 tablet     Refill:  2         Follow Up   Return in about 6 weeks (around 11/10/2022), or if symptoms worsen or fail to improve, for Next scheduled follow up.  Patient was given instructions and counseling regarding his condition or for health maintenance advice. Please see specific information pulled into the AVS if appropriate.       TREATMENT PLAN/GOALS: Continue supportive psychotherapy efforts and medications as indicated. Treatment and medication options discussed during today's visit. Patient acknowledged and verbally consented to continue with current treatment plan and was educated on the importance of compliance with treatment and follow-up appointments.    MEDICATION ISSUES:  Discussed medication options and treatment plan of prescribed medication as well as the risks, benefits, and side effects including potential falls, possible impaired driving and metabolic adversities among others. Patient is agreeable to call the office with any worsening of symptoms or onset of side effects. Patient is agreeable to call 911 or go to the nearest ER should he/she begin having SI/HI.          This document has been electronically signed by CLAUDIA Rogers, PMHNP-BC  September 29, 2022 15:35 EDT      Part of this note may be an electronic transcription/translation of spoken language to printed text using the Dragon Dictation System.